# Patient Record
Sex: MALE | Race: WHITE | NOT HISPANIC OR LATINO
[De-identification: names, ages, dates, MRNs, and addresses within clinical notes are randomized per-mention and may not be internally consistent; named-entity substitution may affect disease eponyms.]

---

## 2017-01-12 ENCOUNTER — APPOINTMENT (OUTPATIENT)
Dept: FAMILY MEDICINE | Facility: CLINIC | Age: 57
End: 2017-01-12

## 2017-01-12 ENCOUNTER — NON-APPOINTMENT (OUTPATIENT)
Age: 57
End: 2017-01-12

## 2017-01-12 VITALS
HEIGHT: 72 IN | WEIGHT: 228.13 LBS | BODY MASS INDEX: 30.9 KG/M2 | DIASTOLIC BLOOD PRESSURE: 62 MMHG | SYSTOLIC BLOOD PRESSURE: 120 MMHG

## 2017-01-12 DIAGNOSIS — Z13.0 ENCOUNTER FOR SCREENING FOR OTHER SUSPECTED ENDOCRINE DISORDER: ICD-10-CM

## 2017-01-12 DIAGNOSIS — Z13.228 ENCOUNTER FOR SCREENING FOR OTHER SUSPECTED ENDOCRINE DISORDER: ICD-10-CM

## 2017-01-12 DIAGNOSIS — Z12.5 ENCOUNTER FOR SCREENING FOR MALIGNANT NEOPLASM OF PROSTATE: ICD-10-CM

## 2017-01-12 DIAGNOSIS — Z13.29 ENCOUNTER FOR SCREENING FOR OTHER SUSPECTED ENDOCRINE DISORDER: ICD-10-CM

## 2017-01-12 DIAGNOSIS — Z13.21 ENCOUNTER FOR SCREENING FOR NUTRITIONAL DISORDER: ICD-10-CM

## 2017-01-17 LAB
25(OH)D3 SERPL-MCNC: 29.1 NG/ML
ALBUMIN SERPL ELPH-MCNC: 4.2 G/DL
ALP BLD-CCNC: 42 U/L
ALT SERPL-CCNC: 29 U/L
ANION GAP SERPL CALC-SCNC: 20 MMOL/L
AST SERPL-CCNC: 34 U/L
BASOPHILS # BLD AUTO: 0.04 K/UL
BASOPHILS NFR BLD AUTO: 0.5 %
BILIRUB SERPL-MCNC: 0.3 MG/DL
BUN SERPL-MCNC: 26 MG/DL
CALCIUM SERPL-MCNC: 9.7 MG/DL
CHLORIDE SERPL-SCNC: 97 MMOL/L
CHOLEST SERPL-MCNC: 200 MG/DL
CHOLEST/HDLC SERPL: 4.3 RATIO
CO2 SERPL-SCNC: 24 MMOL/L
CREAT SERPL-MCNC: 1.19 MG/DL
EOSINOPHIL # BLD AUTO: 0.38 K/UL
EOSINOPHIL NFR BLD AUTO: 4.8 %
GLUCOSE SERPL-MCNC: 92 MG/DL
HCT VFR BLD CALC: 46 %
HDLC SERPL-MCNC: 47 MG/DL
HGB BLD-MCNC: 14.9 G/DL
IMM GRANULOCYTES NFR BLD AUTO: 0.3 %
LDLC SERPL CALC-MCNC: 113 MG/DL
LYMPHOCYTES # BLD AUTO: 2.66 K/UL
LYMPHOCYTES NFR BLD AUTO: 33.7 %
MAN DIFF?: NORMAL
MCHC RBC-ENTMCNC: 28.9 PG
MCHC RBC-ENTMCNC: 32.4 GM/DL
MCV RBC AUTO: 89.1 FL
MONOCYTES # BLD AUTO: 0.68 K/UL
MONOCYTES NFR BLD AUTO: 8.6 %
NEUTROPHILS # BLD AUTO: 4.11 K/UL
NEUTROPHILS NFR BLD AUTO: 52.1 %
PLATELET # BLD AUTO: 331 K/UL
POTASSIUM SERPL-SCNC: 4 MMOL/L
PROT SERPL-MCNC: 7.4 G/DL
PSA SERPL-MCNC: 1.3 NG/ML
RBC # BLD: 5.16 M/UL
RBC # FLD: 13 %
SODIUM SERPL-SCNC: 141 MMOL/L
T4 SERPL-MCNC: 7.5 UG/DL
TRIGL SERPL-MCNC: 202 MG/DL
TSH SERPL-ACNC: 1.93 UIU/ML
URATE SERPL-MCNC: 4.8 MG/DL
WBC # FLD AUTO: 7.89 K/UL

## 2017-03-31 ENCOUNTER — RX RENEWAL (OUTPATIENT)
Age: 57
End: 2017-03-31

## 2017-04-03 ENCOUNTER — RX RENEWAL (OUTPATIENT)
Age: 57
End: 2017-04-03

## 2017-04-11 ENCOUNTER — RX RENEWAL (OUTPATIENT)
Age: 57
End: 2017-04-11

## 2017-10-03 ENCOUNTER — RX RENEWAL (OUTPATIENT)
Age: 57
End: 2017-10-03

## 2018-01-05 ENCOUNTER — RX RENEWAL (OUTPATIENT)
Age: 58
End: 2018-01-05

## 2018-01-24 ENCOUNTER — APPOINTMENT (OUTPATIENT)
Dept: FAMILY MEDICINE | Facility: CLINIC | Age: 58
End: 2018-01-24
Payer: COMMERCIAL

## 2018-01-24 ENCOUNTER — NON-APPOINTMENT (OUTPATIENT)
Age: 58
End: 2018-01-24

## 2018-01-24 VITALS
DIASTOLIC BLOOD PRESSURE: 70 MMHG | BODY MASS INDEX: 30.76 KG/M2 | HEART RATE: 74 BPM | OXYGEN SATURATION: 98 % | WEIGHT: 227.13 LBS | SYSTOLIC BLOOD PRESSURE: 122 MMHG | HEIGHT: 72 IN

## 2018-01-24 DIAGNOSIS — Z13.220 ENCOUNTER FOR SCREENING FOR LIPOID DISORDERS: ICD-10-CM

## 2018-01-24 PROCEDURE — 99396 PREV VISIT EST AGE 40-64: CPT | Mod: 25

## 2018-01-24 PROCEDURE — 36415 COLL VENOUS BLD VENIPUNCTURE: CPT

## 2018-01-24 PROCEDURE — 93000 ELECTROCARDIOGRAM COMPLETE: CPT

## 2018-01-25 LAB
ALBUMIN SERPL ELPH-MCNC: 4 G/DL
ALP BLD-CCNC: 40 U/L
ALT SERPL-CCNC: 26 U/L
ANION GAP SERPL CALC-SCNC: 15 MMOL/L
AST SERPL-CCNC: 32 U/L
BASOPHILS # BLD AUTO: 0.03 K/UL
BASOPHILS NFR BLD AUTO: 0.4 %
BILIRUB SERPL-MCNC: 0.3 MG/DL
BUN SERPL-MCNC: 25 MG/DL
CALCIUM SERPL-MCNC: 9.6 MG/DL
CHLORIDE SERPL-SCNC: 101 MMOL/L
CHOLEST SERPL-MCNC: 190 MG/DL
CHOLEST/HDLC SERPL: 4.2 RATIO
CO2 SERPL-SCNC: 24 MMOL/L
CREAT SERPL-MCNC: 1.34 MG/DL
EOSINOPHIL # BLD AUTO: 0.23 K/UL
EOSINOPHIL NFR BLD AUTO: 3.4 %
GLUCOSE SERPL-MCNC: 90 MG/DL
HBA1C MFR BLD HPLC: 5.9 %
HCT VFR BLD CALC: 42.3 %
HDLC SERPL-MCNC: 45 MG/DL
HGB BLD-MCNC: 13.8 G/DL
IMM GRANULOCYTES NFR BLD AUTO: 0 %
LDLC SERPL CALC-MCNC: 98 MG/DL
LYMPHOCYTES # BLD AUTO: 1.73 K/UL
LYMPHOCYTES NFR BLD AUTO: 25.2 %
MAN DIFF?: NORMAL
MCHC RBC-ENTMCNC: 28.8 PG
MCHC RBC-ENTMCNC: 32.6 GM/DL
MCV RBC AUTO: 88.1 FL
MONOCYTES # BLD AUTO: 0.55 K/UL
MONOCYTES NFR BLD AUTO: 8 %
NEUTROPHILS # BLD AUTO: 4.32 K/UL
NEUTROPHILS NFR BLD AUTO: 63 %
PLATELET # BLD AUTO: 296 K/UL
POTASSIUM SERPL-SCNC: 4 MMOL/L
PROT SERPL-MCNC: 6.9 G/DL
PSA SERPL-MCNC: 0.57 NG/ML
RBC # BLD: 4.8 M/UL
RBC # FLD: 13.1 %
SODIUM SERPL-SCNC: 140 MMOL/L
T4 SERPL-MCNC: 7.2 UG/DL
TRIGL SERPL-MCNC: 235 MG/DL
TSH SERPL-ACNC: 1.69 UIU/ML
URATE SERPL-MCNC: 6.1 MG/DL
WBC # FLD AUTO: 6.86 K/UL

## 2018-02-05 ENCOUNTER — APPOINTMENT (OUTPATIENT)
Dept: FAMILY MEDICINE | Facility: CLINIC | Age: 58
End: 2018-02-05
Payer: COMMERCIAL

## 2018-02-05 VITALS
BODY MASS INDEX: 30.75 KG/M2 | TEMPERATURE: 98.6 F | OXYGEN SATURATION: 98 % | WEIGHT: 227 LBS | SYSTOLIC BLOOD PRESSURE: 140 MMHG | DIASTOLIC BLOOD PRESSURE: 72 MMHG | RESPIRATION RATE: 16 BRPM | HEIGHT: 72 IN | HEART RATE: 88 BPM

## 2018-02-05 DIAGNOSIS — Z87.09 PERSONAL HISTORY OF OTHER DISEASES OF THE RESPIRATORY SYSTEM: ICD-10-CM

## 2018-02-05 PROCEDURE — 99214 OFFICE O/P EST MOD 30 MIN: CPT

## 2018-02-28 ENCOUNTER — RX RENEWAL (OUTPATIENT)
Age: 58
End: 2018-02-28

## 2018-07-10 ENCOUNTER — RX RENEWAL (OUTPATIENT)
Age: 58
End: 2018-07-10

## 2018-10-10 ENCOUNTER — RX RENEWAL (OUTPATIENT)
Age: 58
End: 2018-10-10

## 2019-01-02 ENCOUNTER — RX RENEWAL (OUTPATIENT)
Age: 59
End: 2019-01-02

## 2019-03-12 ENCOUNTER — APPOINTMENT (OUTPATIENT)
Dept: FAMILY MEDICINE | Facility: CLINIC | Age: 59
End: 2019-03-12
Payer: COMMERCIAL

## 2019-03-12 ENCOUNTER — LABORATORY RESULT (OUTPATIENT)
Age: 59
End: 2019-03-12

## 2019-03-12 ENCOUNTER — NON-APPOINTMENT (OUTPATIENT)
Age: 59
End: 2019-03-12

## 2019-03-12 VITALS
DIASTOLIC BLOOD PRESSURE: 68 MMHG | OXYGEN SATURATION: 98 % | HEIGHT: 72 IN | RESPIRATION RATE: 16 BRPM | HEART RATE: 63 BPM | WEIGHT: 228 LBS | SYSTOLIC BLOOD PRESSURE: 110 MMHG | BODY MASS INDEX: 30.88 KG/M2

## 2019-03-12 DIAGNOSIS — Z12.5 ENCOUNTER FOR SCREENING FOR MALIGNANT NEOPLASM OF PROSTATE: ICD-10-CM

## 2019-03-12 PROCEDURE — 99396 PREV VISIT EST AGE 40-64: CPT | Mod: 25

## 2019-03-12 PROCEDURE — 93000 ELECTROCARDIOGRAM COMPLETE: CPT

## 2019-03-12 NOTE — PHYSICAL EXAM
[No Acute Distress] : no acute distress [Well Nourished] : well nourished [Well Developed] : well developed [Normal Sclera/Conjunctiva] : normal sclera/conjunctiva [Normal Outer Ear/Nose] : the outer ears and nose were normal in appearance [Supple] : supple [No Lymphadenopathy] : no lymphadenopathy [No Respiratory Distress] : no respiratory distress  [Clear to Auscultation] : lungs were clear to auscultation bilaterally [No Accessory Muscle Use] : no accessory muscle use [Normal Rate] : normal rate  [Regular Rhythm] : with a regular rhythm [Normal S1, S2] : normal S1 and S2 [No Murmur] : no murmur heard [Soft] : abdomen soft [Non Tender] : non-tender [Non-distended] : non-distended [No Masses] : no abdominal mass palpated [No HSM] : no HSM [Normal Bowel Sounds] : normal bowel sounds

## 2019-03-12 NOTE — HISTORY OF PRESENT ILLNESS
[FreeTextEntry1] : annual [de-identified] : annual exam well active regular exercise attempting to eat healthy and exercise denies chest pain palpitations or dyspnea history of bph htn hyperlipids controlled on current medications has not had a colonoscopy again agrees to referral

## 2019-03-12 NOTE — HEALTH RISK ASSESSMENT
[Very Good] : ~his/her~  mood as very good [] : No [Reports changes in hearing] : Reports no changes in hearing [Reports changes in vision] : Reports no changes in vision [Reports changes in dental health] : Reports no changes in dental health [ColonoscopyComments] : referral given

## 2019-03-13 LAB
ALBUMIN SERPL ELPH-MCNC: 4.4 G/DL
ALP BLD-CCNC: 38 U/L
ALT SERPL-CCNC: 26 U/L
ANION GAP SERPL CALC-SCNC: 12 MMOL/L
AST SERPL-CCNC: 31 U/L
BASOPHILS # BLD AUTO: 0.05 K/UL
BASOPHILS NFR BLD AUTO: 0.9 %
BILIRUB SERPL-MCNC: 0.4 MG/DL
BUN SERPL-MCNC: 28 MG/DL
CALCIUM SERPL-MCNC: 9.7 MG/DL
CHLORIDE SERPL-SCNC: 101 MMOL/L
CHOLEST SERPL-MCNC: 176 MG/DL
CHOLEST/HDLC SERPL: 3.8 RATIO
CO2 SERPL-SCNC: 27 MMOL/L
CREAT SERPL-MCNC: 1.23 MG/DL
EOSINOPHIL # BLD AUTO: 0.25 K/UL
EOSINOPHIL NFR BLD AUTO: 4.6 %
GLUCOSE SERPL-MCNC: 88 MG/DL
HBA1C MFR BLD HPLC: 6 %
HCT VFR BLD CALC: 48 %
HDLC SERPL-MCNC: 46 MG/DL
HGB BLD-MCNC: 15.1 G/DL
IMM GRANULOCYTES NFR BLD AUTO: 0.2 %
LDLC SERPL CALC-MCNC: 96 MG/DL
LYMPHOCYTES # BLD AUTO: 1.59 K/UL
LYMPHOCYTES NFR BLD AUTO: 29.3 %
MAN DIFF?: NORMAL
MCHC RBC-ENTMCNC: 27.9 PG
MCHC RBC-ENTMCNC: 31.5 GM/DL
MCV RBC AUTO: 88.6 FL
MONOCYTES # BLD AUTO: 0.79 K/UL
MONOCYTES NFR BLD AUTO: 14.6 %
NEUTROPHILS # BLD AUTO: 2.73 K/UL
NEUTROPHILS NFR BLD AUTO: 50.4 %
PLATELET # BLD AUTO: 286 K/UL
POTASSIUM SERPL-SCNC: 4.4 MMOL/L
PROT SERPL-MCNC: 7 G/DL
PSA SERPL-MCNC: 1.59 NG/ML
RBC # BLD: 5.42 M/UL
RBC # FLD: 13.2 %
SODIUM SERPL-SCNC: 140 MMOL/L
T4 SERPL-MCNC: 7.4 UG/DL
TRIGL SERPL-MCNC: 169 MG/DL
TSH SERPL-ACNC: 1.52 UIU/ML
URATE SERPL-MCNC: 5 MG/DL
WBC # FLD AUTO: 5.42 K/UL

## 2019-03-14 LAB
25(OH)D3 SERPL-MCNC: 42.2 NG/ML
B BURGDOR IGG+IGM SER QL IB: NORMAL

## 2019-06-14 ENCOUNTER — APPOINTMENT (OUTPATIENT)
Dept: FAMILY MEDICINE | Facility: CLINIC | Age: 59
End: 2019-06-14
Payer: COMMERCIAL

## 2019-06-14 VITALS
HEIGHT: 72 IN | HEART RATE: 76 BPM | RESPIRATION RATE: 16 BRPM | BODY MASS INDEX: 30.88 KG/M2 | SYSTOLIC BLOOD PRESSURE: 110 MMHG | DIASTOLIC BLOOD PRESSURE: 60 MMHG | OXYGEN SATURATION: 97 % | WEIGHT: 228 LBS

## 2019-06-14 DIAGNOSIS — H11.31 CONJUNCTIVAL HEMORRHAGE, RIGHT EYE: ICD-10-CM

## 2019-06-14 PROCEDURE — 99213 OFFICE O/P EST LOW 20 MIN: CPT

## 2019-06-14 NOTE — HISTORY OF PRESENT ILLNESS
[FreeTextEntry8] : Pt c/o blood on sclera of R eye that appeared 3 days ago. Pt has HTN, controlled on meds. Pt consistent w/ taking meds. Denies pain in eye, blurry vision, double vision.

## 2019-08-07 ENCOUNTER — APPOINTMENT (OUTPATIENT)
Dept: FAMILY MEDICINE | Facility: CLINIC | Age: 59
End: 2019-08-07
Payer: COMMERCIAL

## 2019-08-07 VITALS
OXYGEN SATURATION: 96 % | BODY MASS INDEX: 30.07 KG/M2 | WEIGHT: 222 LBS | SYSTOLIC BLOOD PRESSURE: 110 MMHG | DIASTOLIC BLOOD PRESSURE: 60 MMHG | HEIGHT: 72 IN | HEART RATE: 67 BPM

## 2019-08-07 DIAGNOSIS — K62.5 HEMORRHAGE OF ANUS AND RECTUM: ICD-10-CM

## 2019-08-07 PROCEDURE — 99214 OFFICE O/P EST MOD 30 MIN: CPT

## 2019-08-07 NOTE — HISTORY OF PRESENT ILLNESS
[FreeTextEntry8] : saw cardiology for dizziness has halter on had blood in stool here for colonoscopy referral which he has not had done and repeat labs well active denies gi symptoms other than blood strains hard stools

## 2019-08-12 LAB
BASOPHILS # BLD AUTO: 0.06 K/UL
BASOPHILS NFR BLD AUTO: 0.7 %
EOSINOPHIL # BLD AUTO: 0.26 K/UL
EOSINOPHIL NFR BLD AUTO: 3.2 %
FERRITIN SERPL-MCNC: 62 NG/ML
HCT VFR BLD CALC: 48.9 %
HGB BLD-MCNC: 15.5 G/DL
IMM GRANULOCYTES NFR BLD AUTO: 0.5 %
IRON SATN MFR SERPL: 17 %
IRON SERPL-MCNC: 66 UG/DL
LYMPHOCYTES # BLD AUTO: 1.96 K/UL
LYMPHOCYTES NFR BLD AUTO: 24 %
MAN DIFF?: NORMAL
MCHC RBC-ENTMCNC: 28.7 PG
MCHC RBC-ENTMCNC: 31.7 GM/DL
MCV RBC AUTO: 90.4 FL
MONOCYTES # BLD AUTO: 0.83 K/UL
MONOCYTES NFR BLD AUTO: 10.2 %
NEUTROPHILS # BLD AUTO: 5.01 K/UL
NEUTROPHILS NFR BLD AUTO: 61.4 %
PLATELET # BLD AUTO: 308 K/UL
RBC # BLD: 5.41 M/UL
RBC # FLD: 13.2 %
TIBC SERPL-MCNC: 380 UG/DL
UIBC SERPL-MCNC: 314 UG/DL
VIT B12 SERPL-MCNC: >2000 PG/ML
WBC # FLD AUTO: 8.16 K/UL

## 2019-09-06 ENCOUNTER — RX RENEWAL (OUTPATIENT)
Age: 59
End: 2019-09-06

## 2019-09-09 ENCOUNTER — RX RENEWAL (OUTPATIENT)
Age: 59
End: 2019-09-09

## 2019-09-10 ENCOUNTER — RX RENEWAL (OUTPATIENT)
Age: 59
End: 2019-09-10

## 2019-12-05 ENCOUNTER — RX RENEWAL (OUTPATIENT)
Age: 59
End: 2019-12-05

## 2019-12-05 RX ORDER — AMOXICILLIN AND CLAVULANATE POTASSIUM 875; 125 MG/1; MG/1
875-125 TABLET, COATED ORAL
Qty: 14 | Refills: 0 | Status: COMPLETED | COMMUNITY
Start: 2018-02-05 | End: 2019-12-05

## 2020-01-03 ENCOUNTER — TRANSCRIPTION ENCOUNTER (OUTPATIENT)
Age: 60
End: 2020-01-03

## 2020-03-10 ENCOUNTER — RX RENEWAL (OUTPATIENT)
Age: 60
End: 2020-03-10

## 2020-03-11 ENCOUNTER — APPOINTMENT (OUTPATIENT)
Dept: FAMILY MEDICINE | Facility: CLINIC | Age: 60
End: 2020-03-11
Payer: COMMERCIAL

## 2020-03-11 ENCOUNTER — RX RENEWAL (OUTPATIENT)
Age: 60
End: 2020-03-11

## 2020-03-11 VITALS
OXYGEN SATURATION: 95 % | RESPIRATION RATE: 16 BRPM | HEART RATE: 60 BPM | WEIGHT: 230 LBS | DIASTOLIC BLOOD PRESSURE: 70 MMHG | SYSTOLIC BLOOD PRESSURE: 110 MMHG | BODY MASS INDEX: 31.15 KG/M2 | HEIGHT: 72 IN

## 2020-03-11 DIAGNOSIS — M72.2 PLANTAR FASCIAL FIBROMATOSIS: ICD-10-CM

## 2020-03-11 PROCEDURE — 99213 OFFICE O/P EST LOW 20 MIN: CPT

## 2020-07-28 ENCOUNTER — RX RENEWAL (OUTPATIENT)
Age: 60
End: 2020-07-28

## 2020-08-24 ENCOUNTER — RX RENEWAL (OUTPATIENT)
Age: 60
End: 2020-08-24

## 2020-09-21 ENCOUNTER — RX RENEWAL (OUTPATIENT)
Age: 60
End: 2020-09-21

## 2020-10-21 ENCOUNTER — RX RENEWAL (OUTPATIENT)
Age: 60
End: 2020-10-21

## 2020-11-19 ENCOUNTER — RX RENEWAL (OUTPATIENT)
Age: 60
End: 2020-11-19

## 2020-12-16 PROBLEM — Z87.09 HISTORY OF ACUTE SINUSITIS: Status: RESOLVED | Noted: 2018-02-05 | Resolved: 2020-12-16

## 2020-12-21 ENCOUNTER — RX RENEWAL (OUTPATIENT)
Age: 60
End: 2020-12-21

## 2021-01-18 ENCOUNTER — RX RENEWAL (OUTPATIENT)
Age: 61
End: 2021-01-18

## 2021-03-01 ENCOUNTER — APPOINTMENT (OUTPATIENT)
Dept: FAMILY MEDICINE | Facility: CLINIC | Age: 61
End: 2021-03-01
Payer: COMMERCIAL

## 2021-03-01 VITALS
WEIGHT: 221 LBS | TEMPERATURE: 97.6 F | HEART RATE: 69 BPM | DIASTOLIC BLOOD PRESSURE: 74 MMHG | OXYGEN SATURATION: 96 % | HEIGHT: 72 IN | SYSTOLIC BLOOD PRESSURE: 116 MMHG | BODY MASS INDEX: 29.93 KG/M2

## 2021-03-01 PROCEDURE — 99213 OFFICE O/P EST LOW 20 MIN: CPT

## 2021-03-01 PROCEDURE — 99072 ADDL SUPL MATRL&STAF TM PHE: CPT

## 2021-03-22 ENCOUNTER — RX RENEWAL (OUTPATIENT)
Age: 61
End: 2021-03-22

## 2021-04-06 ENCOUNTER — APPOINTMENT (OUTPATIENT)
Dept: COLORECTAL SURGERY | Facility: CLINIC | Age: 61
End: 2021-04-06
Payer: COMMERCIAL

## 2021-04-06 VITALS
TEMPERATURE: 97.2 F | HEIGHT: 72 IN | WEIGHT: 210 LBS | DIASTOLIC BLOOD PRESSURE: 65 MMHG | RESPIRATION RATE: 16 BRPM | HEART RATE: 74 BPM | BODY MASS INDEX: 28.44 KG/M2 | SYSTOLIC BLOOD PRESSURE: 101 MMHG

## 2021-04-06 DIAGNOSIS — K92.1 MELENA: ICD-10-CM

## 2021-04-06 DIAGNOSIS — K92.2 GASTROINTESTINAL HEMORRHAGE, UNSPECIFIED: ICD-10-CM

## 2021-04-06 PROCEDURE — 99243 OFF/OP CNSLTJ NEW/EST LOW 30: CPT

## 2021-04-06 PROCEDURE — 99072 ADDL SUPL MATRL&STAF TM PHE: CPT

## 2021-04-06 NOTE — ASSESSMENT
[FreeTextEntry1] : 61-year-old male with gastrointestinal bleeding. Recommend colonoscopy. Risks and benefits of colonoscopy have been discussed which include but not limited to bleeding, perforation, missing a cancer or polyp occurring 5%.\par Recommend high fiber diet, Metamucil daily, sitz baths, stool softeners, pain medications p.r.n.

## 2021-04-06 NOTE — PHYSICAL EXAM
[Abdomen Masses] : No abdominal masses [Abdomen Tenderness] : ~T No ~M abdominal tenderness [Tender] : nontender [JVD] : no jugular venous distention  [Normal Breath Sounds] : Normal breath sounds [Normal Heart Sounds] : normal heart sounds [Normal Rate and Rhythm] : normal rate and rhythm [No Rash or Lesion] : No rash or lesion [Alert] : alert [Oriented to Person] : oriented to person [Oriented to Place] : oriented to place [Oriented to Time] : oriented to time [Calm] : calm [de-identified] : Looks well in no distress, of stated age. [de-identified] : pupils equal reactive to light normocephalic atraumatic. [de-identified] : moves all 4 extremities appropriately with 5 over 5 strength

## 2021-04-06 NOTE — HISTORY OF PRESENT ILLNESS
[FreeTextEntry1] : Consultation from Gene Yao for her gastrointestinal bleeding. 61-year-old male with complaints of GI bleeding for several years symptoms have been worsening bleeding is bright red with bowel movements.patient has never had a colonoscopy

## 2021-04-06 NOTE — REVIEW OF SYSTEMS
[Feeling Poorly] : feeling poorly [As Noted in HPI] : as noted in HPI [Melena] : meldenis [Negative] : Heme/Lymph [FreeTextEntry7] : bleeding

## 2021-04-06 NOTE — CONSULT LETTER
[Dear  ___] : Dear  [unfilled], [Consult Letter:] : I had the pleasure of evaluating your patient, [unfilled]. [Please see my note below.] : Please see my note below. [Consult Closing:] : Thank you very much for allowing me to participate in the care of this patient.  If you have any questions, please do not hesitate to contact me. [Sincerely,] : Sincerely, [FreeTextEntry3] : Tevin Colon M.D. FACS, FASCRS

## 2021-04-19 ENCOUNTER — RX RENEWAL (OUTPATIENT)
Age: 61
End: 2021-04-19

## 2021-05-17 ENCOUNTER — RX RENEWAL (OUTPATIENT)
Age: 61
End: 2021-05-17

## 2021-05-22 ENCOUNTER — APPOINTMENT (OUTPATIENT)
Dept: DISASTER EMERGENCY | Facility: CLINIC | Age: 61
End: 2021-05-22

## 2021-05-22 LAB — SARS-COV-2 N GENE NPH QL NAA+PROBE: NOT DETECTED

## 2021-05-25 ENCOUNTER — APPOINTMENT (OUTPATIENT)
Dept: COLORECTAL SURGERY | Facility: CLINIC | Age: 61
End: 2021-05-25
Payer: COMMERCIAL

## 2021-05-25 DIAGNOSIS — K62.89 OTHER SPECIFIED DISEASES OF ANUS AND RECTUM: ICD-10-CM

## 2021-05-25 PROCEDURE — 45381 COLONOSCOPY SUBMUCOUS NJX: CPT

## 2021-05-25 PROCEDURE — 45380 COLONOSCOPY AND BIOPSY: CPT

## 2021-06-14 ENCOUNTER — RX RENEWAL (OUTPATIENT)
Age: 61
End: 2021-06-14

## 2021-09-13 ENCOUNTER — RX RENEWAL (OUTPATIENT)
Age: 61
End: 2021-09-13

## 2021-10-12 ENCOUNTER — RX RENEWAL (OUTPATIENT)
Age: 61
End: 2021-10-12

## 2021-11-16 ENCOUNTER — RX RENEWAL (OUTPATIENT)
Age: 61
End: 2021-11-16

## 2022-01-27 ENCOUNTER — RX RENEWAL (OUTPATIENT)
Age: 62
End: 2022-01-27

## 2022-01-31 ENCOUNTER — RX RENEWAL (OUTPATIENT)
Age: 62
End: 2022-01-31

## 2022-02-01 ENCOUNTER — RX RENEWAL (OUTPATIENT)
Age: 62
End: 2022-02-01

## 2022-02-08 ENCOUNTER — RX RENEWAL (OUTPATIENT)
Age: 62
End: 2022-02-08

## 2022-04-16 ENCOUNTER — TRANSCRIPTION ENCOUNTER (OUTPATIENT)
Age: 62
End: 2022-04-16

## 2022-04-27 ENCOUNTER — LABORATORY RESULT (OUTPATIENT)
Age: 62
End: 2022-04-27

## 2022-04-27 ENCOUNTER — RX RENEWAL (OUTPATIENT)
Age: 62
End: 2022-04-27

## 2022-04-27 ENCOUNTER — APPOINTMENT (OUTPATIENT)
Dept: FAMILY MEDICINE | Facility: CLINIC | Age: 62
End: 2022-04-27
Payer: COMMERCIAL

## 2022-04-27 VITALS
HEIGHT: 72 IN | TEMPERATURE: 97.2 F | SYSTOLIC BLOOD PRESSURE: 118 MMHG | DIASTOLIC BLOOD PRESSURE: 80 MMHG | HEART RATE: 75 BPM | WEIGHT: 220 LBS | OXYGEN SATURATION: 97 % | BODY MASS INDEX: 29.8 KG/M2

## 2022-04-27 PROCEDURE — 99213 OFFICE O/P EST LOW 20 MIN: CPT

## 2022-04-27 NOTE — HISTORY OF PRESENT ILLNESS
[FreeTextEntry1] : follow up [de-identified] : follow up on htn controlled on current medication denies headache chest pain palpitations or dyspnea had colonoscopy states benign mass has not followe up advised

## 2022-05-02 LAB
25(OH)D3 SERPL-MCNC: 31.9 NG/ML
ALBUMIN SERPL ELPH-MCNC: 4.5 G/DL
ALP BLD-CCNC: 46 U/L
ALT SERPL-CCNC: 25 U/L
ANION GAP SERPL CALC-SCNC: 14 MMOL/L
AST SERPL-CCNC: 27 U/L
BASOPHILS # BLD AUTO: 0.04 K/UL
BASOPHILS NFR BLD AUTO: 0.7 %
BILIRUB SERPL-MCNC: 0.4 MG/DL
BUN SERPL-MCNC: 26 MG/DL
CALCIUM SERPL-MCNC: 10.1 MG/DL
CHLORIDE SERPL-SCNC: 98 MMOL/L
CHOLEST SERPL-MCNC: 195 MG/DL
CO2 SERPL-SCNC: 25 MMOL/L
CREAT SERPL-MCNC: 1.15 MG/DL
EGFR: 71 ML/MIN/1.73M2
EOSINOPHIL # BLD AUTO: 0.29 K/UL
EOSINOPHIL NFR BLD AUTO: 4.8 %
ESTIMATED AVERAGE GLUCOSE: 126 MG/DL
GLUCOSE SERPL-MCNC: 108 MG/DL
HBA1C MFR BLD HPLC: 6 %
HCT VFR BLD CALC: 47.3 %
HDLC SERPL-MCNC: 46 MG/DL
HGB BLD-MCNC: 15.4 G/DL
IMM GRANULOCYTES NFR BLD AUTO: 0.3 %
LDLC SERPL CALC-MCNC: 116 MG/DL
LYMPHOCYTES # BLD AUTO: 1.6 K/UL
LYMPHOCYTES NFR BLD AUTO: 26.3 %
MAN DIFF?: NORMAL
MCHC RBC-ENTMCNC: 28.8 PG
MCHC RBC-ENTMCNC: 32.6 GM/DL
MCV RBC AUTO: 88.4 FL
MONOCYTES # BLD AUTO: 0.73 K/UL
MONOCYTES NFR BLD AUTO: 12 %
NEUTROPHILS # BLD AUTO: 3.41 K/UL
NEUTROPHILS NFR BLD AUTO: 55.9 %
NONHDLC SERPL-MCNC: 148 MG/DL
PLATELET # BLD AUTO: 327 K/UL
POTASSIUM SERPL-SCNC: 4.4 MMOL/L
PROT SERPL-MCNC: 7.1 G/DL
PSA SERPL-MCNC: 1.92 NG/ML
RBC # BLD: 5.35 M/UL
RBC # FLD: 13 %
SODIUM SERPL-SCNC: 137 MMOL/L
T4 SERPL-MCNC: 7.2 UG/DL
TRIGL SERPL-MCNC: 161 MG/DL
TSH SERPL-ACNC: 1.56 UIU/ML
URATE SERPL-MCNC: 4.8 MG/DL
WBC # FLD AUTO: 6.09 K/UL

## 2022-07-11 NOTE — REVIEW OF SYSTEMS
"Jd"Salma Dill returns to clinic today for continued evaluation of allergic rhinitis, cough, and LPR. He was last seen April 11, 2022.     After our discussion last visit, he did purchase a bed he is able to elevate. Since sleeping more upright, his symptoms have improved. He has not had any cough or throat clearing.    He feels that elevating the head of his bed works better than omeprazole twice a day.    He does have some mild nasal congestion that is predominantly on the right. This is improved with using azelastine.    He is not having any heartburn.    He did see Dr. Gualberto Meza in ENT. He did see a nasal septal deviation and inferior nasal turbinate hypertrophy. He suggested a GI evaluation.    [facility] PEQ ALLERGY QUESTIONNAIRE SHORT 7/11/2022   Head or facial pain: -   Facial swelling? No   Sinus pain? No   Sinus pressure? Yes   Ears: No symptoms   Ear discharge? -   Ear pain? -   Hearing loss? -   Nose: -   Nosebleeds? No   Postnasal drip? No   Sneezing? Yes   Runny nose? Yes   Congestion? Yes   Throat: No symptoms   Sore throat? -   Trouble swallowing? -   Voice change? -   Eyes: -   Eye itching? Yes   Eye redness? No   Eye discharge? No   Eye pain?  No   Light sensitivity / light hurts the eyes? No   Lungs: No symptoms   Cough? -   Wheezing? -   Shortness of breath? -   Apnea? -   Choking? -   Chest tightness? -   Skin: No symptoms   Rash? -   Color change of skin? -     Physical Examination:  General: Well-developed, well-nourished, no acute distress. No throat clearing.  Head: No sinus tenderness.  Eyes: Conjunctivae:  No bulbar or palpebral conjunctival injection.  Ears: EAC's clear.  TM's clear.  No pre-auricular nodes.  Nose: Nasal Mucosa:  Pink.  Septum: No apparent deviation.  Turbinates:  No significant edema.  Polyps/Mass:  None visible.  Teeth/Gums:  No bleeding noted.  Oropharynx: No exudates.  Neck: Supple without thyromegaly. No cervical lymphadenopathy.    Respiratory/Chest: Effort: Good.  " Auscultation:  Clear bilaterally.  Skin: Good turgor.  No urticaria or angioedema.  Neuro/Psych: Oriented x 3.    Laboratory 08/28/2018:  IgE level:  167.  ImmunoCAP:  Class II:  DM f.  Class I:  DM pt.      Assessment:  1.  Allergic rhinitis, controlled.  2.  Mild GERD, controlled.  3.  LPR, improved.  4. Nasal septal deviation.   5.  Hypertension on amlodipine, losartan, and metoprolol.  6.  History of nephrolithiasis.  7.  BPH.    Recommendations:  1.  Continue house dust mite avoidance procedures.  2.  Continue azelastine 1 to 2 sprays each nostril b.i.d. p.r.n. rhinitis.  3.  Continue omeprazole 40 milligrams once a day.  4.  GI evaluation.  5.  House dust mite information was given. He thinks he has this at home.   6.  Return to clinic in six months or sooner if needed.    Patient education January 13, 2021:  Discussed long-term safety profile of PPIs.    Patient education September 4, 2018:  Allergic mechanisms and treatment options were reviewed in detail.  House dust mite avoidance was reviewed.   LPR and web site were reviewed.           [Negative] : Respiratory

## 2022-07-26 ENCOUNTER — RX RENEWAL (OUTPATIENT)
Age: 62
End: 2022-07-26

## 2022-09-13 ENCOUNTER — NON-APPOINTMENT (OUTPATIENT)
Age: 62
End: 2022-09-13

## 2022-09-13 ENCOUNTER — APPOINTMENT (OUTPATIENT)
Dept: COLORECTAL SURGERY | Facility: CLINIC | Age: 62
End: 2022-09-13

## 2022-09-13 VITALS
RESPIRATION RATE: 14 BRPM | BODY MASS INDEX: 28.71 KG/M2 | HEART RATE: 80 BPM | HEIGHT: 72 IN | SYSTOLIC BLOOD PRESSURE: 107 MMHG | WEIGHT: 212 LBS | DIASTOLIC BLOOD PRESSURE: 69 MMHG | OXYGEN SATURATION: 97 % | TEMPERATURE: 98.1 F

## 2022-09-13 DIAGNOSIS — R19.4 CHANGE IN BOWEL HABIT: ICD-10-CM

## 2022-09-13 PROCEDURE — 99214 OFFICE O/P EST MOD 30 MIN: CPT

## 2022-09-13 NOTE — HISTORY OF PRESENT ILLNESS
[FreeTextEntry1] : 62-year-old male with previous colon polyps now with changes in bowel habits with increasing loose bowel movements and urgency, symptoms have been worsening over the last 6 months

## 2022-09-13 NOTE — PHYSICAL EXAM
[Abdomen Masses] : No abdominal masses [Abdomen Tenderness] : ~T No ~M abdominal tenderness [Tender] : nontender [No Rash or Lesion] : No rash or lesion [Alert] : alert [Oriented to Person] : oriented to person [Oriented to Place] : oriented to place [Oriented to Time] : oriented to time [Calm] : calm [de-identified] : Looks well in no distress, of stated age.

## 2022-09-13 NOTE — ASSESSMENT
[FreeTextEntry1] :  62-year-old male with changes in bowel habits with increasing looseness and urgency, history of colon polyps.  Recommend CAT scan of abdomen pelvis p.o. and IV contrast CBC BMP liver function test CEA recommend colonoscopy. Risks and benefits of colonoscopy have been discussed which include but not limited to bleeding, perforation, missing a cancer or polyp occurring 5%.

## 2022-09-23 ENCOUNTER — RESULT REVIEW (OUTPATIENT)
Age: 62
End: 2022-09-23

## 2022-09-28 ENCOUNTER — APPOINTMENT (OUTPATIENT)
Dept: CT IMAGING | Facility: CLINIC | Age: 62
End: 2022-09-28

## 2022-09-28 ENCOUNTER — OUTPATIENT (OUTPATIENT)
Dept: OUTPATIENT SERVICES | Facility: HOSPITAL | Age: 62
LOS: 1 days | End: 2022-09-28
Payer: COMMERCIAL

## 2022-09-28 DIAGNOSIS — Z86.010 PERSONAL HISTORY OF COLONIC POLYPS: ICD-10-CM

## 2022-09-28 DIAGNOSIS — R19.4 CHANGE IN BOWEL HABIT: ICD-10-CM

## 2022-09-28 PROCEDURE — 74177 CT ABD & PELVIS W/CONTRAST: CPT

## 2022-09-28 PROCEDURE — 74177 CT ABD & PELVIS W/CONTRAST: CPT | Mod: 26

## 2022-11-28 DIAGNOSIS — Z00.00 ENCOUNTER FOR GENERAL ADULT MEDICAL EXAMINATION W/OUT ABNORMAL FINDINGS: ICD-10-CM

## 2022-12-12 ENCOUNTER — NON-APPOINTMENT (OUTPATIENT)
Age: 62
End: 2022-12-12

## 2022-12-12 LAB
ALBUMIN SERPL ELPH-MCNC: 4.3 G/DL
ALP BLD-CCNC: 52 U/L
ALT SERPL-CCNC: 17 U/L
ANION GAP SERPL CALC-SCNC: 11 MMOL/L
AST SERPL-CCNC: 22 U/L
BASOPHILS # BLD AUTO: 0.05 K/UL
BASOPHILS NFR BLD AUTO: 0.5 %
BILIRUB SERPL-MCNC: 0.4 MG/DL
BUN SERPL-MCNC: 27 MG/DL
CALCIUM SERPL-MCNC: 9.7 MG/DL
CEA SERPL-MCNC: 3 NG/ML
CHLORIDE SERPL-SCNC: 105 MMOL/L
CHOLEST SERPL-MCNC: 192 MG/DL
CO2 SERPL-SCNC: 26 MMOL/L
CREAT SERPL-MCNC: 1.13 MG/DL
EGFR: 73 ML/MIN/1.73M2
EOSINOPHIL # BLD AUTO: 0.25 K/UL
EOSINOPHIL NFR BLD AUTO: 2.4 %
GLUCOSE SERPL-MCNC: 77 MG/DL
HCT VFR BLD CALC: 47.9 %
HDLC SERPL-MCNC: 48 MG/DL
HGB BLD-MCNC: 15.5 G/DL
IMM GRANULOCYTES NFR BLD AUTO: 0.3 %
LDLC SERPL CALC-MCNC: 120 MG/DL
LYMPHOCYTES # BLD AUTO: 0.87 K/UL
LYMPHOCYTES NFR BLD AUTO: 8.5 %
MAN DIFF?: NORMAL
MCHC RBC-ENTMCNC: 28.3 PG
MCHC RBC-ENTMCNC: 32.4 GM/DL
MCV RBC AUTO: 87.6 FL
MONOCYTES # BLD AUTO: 0.85 K/UL
MONOCYTES NFR BLD AUTO: 8.3 %
NEUTROPHILS # BLD AUTO: 8.2 K/UL
NEUTROPHILS NFR BLD AUTO: 80 %
NONHDLC SERPL-MCNC: 144 MG/DL
PLATELET # BLD AUTO: 305 K/UL
POTASSIUM SERPL-SCNC: 4.6 MMOL/L
PROT SERPL-MCNC: 6.4 G/DL
RBC # BLD: 5.47 M/UL
RBC # FLD: 12.9 %
SODIUM SERPL-SCNC: 141 MMOL/L
TRIGL SERPL-MCNC: 119 MG/DL
WBC # FLD AUTO: 10.25 K/UL

## 2022-12-15 ENCOUNTER — RESULT REVIEW (OUTPATIENT)
Age: 62
End: 2022-12-15

## 2022-12-15 ENCOUNTER — APPOINTMENT (OUTPATIENT)
Dept: COLORECTAL SURGERY | Facility: AMBULATORY MEDICAL SERVICES | Age: 62
End: 2022-12-15

## 2022-12-15 PROCEDURE — 45380 COLONOSCOPY AND BIOPSY: CPT

## 2023-01-04 ENCOUNTER — APPOINTMENT (OUTPATIENT)
Dept: GASTROENTEROLOGY | Facility: CLINIC | Age: 63
End: 2023-01-04
Payer: MEDICARE

## 2023-01-04 VITALS
DIASTOLIC BLOOD PRESSURE: 71 MMHG | HEART RATE: 74 BPM | WEIGHT: 206 LBS | HEIGHT: 72 IN | SYSTOLIC BLOOD PRESSURE: 109 MMHG | BODY MASS INDEX: 27.9 KG/M2

## 2023-01-04 DIAGNOSIS — Z86.010 PERSONAL HISTORY OF COLONIC POLYPS: ICD-10-CM

## 2023-01-04 PROCEDURE — 99244 OFF/OP CNSLTJ NEW/EST MOD 40: CPT

## 2023-01-04 PROCEDURE — 99204 OFFICE O/P NEW MOD 45 MIN: CPT

## 2023-01-12 NOTE — ASSESSMENT
[FreeTextEntry1] : Plan:\par Discussed colonoscopy with EMR at length including risk of bleeding or perforation. Discussed alternative to colonoscopy being surgical hemicolectomy with Dr. Colon, discussed risks of that as well. Educated pt at length that if EMR is not deemed safe, will abort procedure. Pt and partner at bedside expressed understanding, as well as pt's daughter via telephone. Instructions reviewed, pt agrees to planned procedure, All questions answered. seen and discussed with Dr. Ovalles. \par \par Cc: Dr. Colon

## 2023-01-12 NOTE — ADDENDUM
[FreeTextEntry1] : I was present with the NP during the history and exam. I discussed the case and examined the patient with the NP and agree with the findings and plan as documented in the NP's note. Briefly, 63 year old man for EMR or large colon polyp. Risks of bleeding and perforation both immediate and delayed, discussed at length. \par \par Cc: Dr. Colon

## 2023-01-12 NOTE — REVIEW OF SYSTEMS
[Diarrhea] : diarrhea [Bleeding] : bleeding [Negative] : Heme/Lymph [Abdominal Pain] : no abdominal pain [Vomiting] : no vomiting [Constipation] : no constipation [Heartburn] : no heartburn [Melena (black stool)] : no melena [Fecal Incontinence (soiling)] : no fecal incontinence [Bloating (gassiness)] : no bloating

## 2023-01-12 NOTE — HISTORY OF PRESENT ILLNESS
[FreeTextEntry1] : Leo Cardenas is a 62 year old male PMH HTN, HLD, BPH presents today as referral from Dr. Colon for large colon polyp.\par \par  Pt had colonoscopy with Dr. Colon revealing a 30mm-40mm polyp, pathology indicated high grade dysplasia. Pt does admit to some irregular bowel habits likely associated, sometimes has diarrhea, also admits to infrequent rectal bleeding with bowel movements. has not happened recently. No melena or cindy hematochezia without bowel movements. No weight loss. Good appetite. No post prandial symptoms. Denies upper GI symptoms such as nausea, vomiting, dysphagia, or odynophagia.

## 2023-01-27 ENCOUNTER — OUTPATIENT (OUTPATIENT)
Dept: OUTPATIENT SERVICES | Facility: HOSPITAL | Age: 63
LOS: 1 days | End: 2023-01-27
Payer: SELF-PAY

## 2023-01-27 VITALS
HEIGHT: 71 IN | WEIGHT: 210.98 LBS | OXYGEN SATURATION: 98 % | DIASTOLIC BLOOD PRESSURE: 69 MMHG | SYSTOLIC BLOOD PRESSURE: 113 MMHG | HEART RATE: 77 BPM | RESPIRATION RATE: 16 BRPM | TEMPERATURE: 98 F

## 2023-01-27 DIAGNOSIS — K63.5 POLYP OF COLON: ICD-10-CM

## 2023-01-27 DIAGNOSIS — Z98.890 OTHER SPECIFIED POSTPROCEDURAL STATES: Chronic | ICD-10-CM

## 2023-01-27 DIAGNOSIS — Z90.89 ACQUIRED ABSENCE OF OTHER ORGANS: Chronic | ICD-10-CM

## 2023-01-27 LAB
ANION GAP SERPL CALC-SCNC: 4 MMOL/L — LOW (ref 5–17)
APTT BLD: 33.3 SEC — SIGNIFICANT CHANGE UP (ref 27.5–35.5)
BASOPHILS # BLD AUTO: 0.04 K/UL — SIGNIFICANT CHANGE UP (ref 0–0.2)
BASOPHILS NFR BLD AUTO: 0.6 % — SIGNIFICANT CHANGE UP (ref 0–2)
BUN SERPL-MCNC: 29 MG/DL — HIGH (ref 7–23)
CALCIUM SERPL-MCNC: 9.6 MG/DL — SIGNIFICANT CHANGE UP (ref 8.5–10.1)
CHLORIDE SERPL-SCNC: 104 MMOL/L — SIGNIFICANT CHANGE UP (ref 96–108)
CO2 SERPL-SCNC: 29 MMOL/L — SIGNIFICANT CHANGE UP (ref 22–31)
CREAT SERPL-MCNC: 1.13 MG/DL — SIGNIFICANT CHANGE UP (ref 0.5–1.3)
EGFR: 72 ML/MIN/1.73M2 — SIGNIFICANT CHANGE UP
EOSINOPHIL # BLD AUTO: 0.2 K/UL — SIGNIFICANT CHANGE UP (ref 0–0.5)
EOSINOPHIL NFR BLD AUTO: 3 % — SIGNIFICANT CHANGE UP (ref 0–6)
GLUCOSE SERPL-MCNC: 99 MG/DL — SIGNIFICANT CHANGE UP (ref 70–99)
HCT VFR BLD CALC: 48.4 % — SIGNIFICANT CHANGE UP (ref 39–50)
HGB BLD-MCNC: 15.8 G/DL — SIGNIFICANT CHANGE UP (ref 13–17)
IMM GRANULOCYTES NFR BLD AUTO: 0.1 % — SIGNIFICANT CHANGE UP (ref 0–0.9)
INR BLD: 0.99 RATIO — SIGNIFICANT CHANGE UP (ref 0.88–1.16)
LYMPHOCYTES # BLD AUTO: 1.52 K/UL — SIGNIFICANT CHANGE UP (ref 1–3.3)
LYMPHOCYTES # BLD AUTO: 22.8 % — SIGNIFICANT CHANGE UP (ref 13–44)
MCHC RBC-ENTMCNC: 28.3 PG — SIGNIFICANT CHANGE UP (ref 27–34)
MCHC RBC-ENTMCNC: 32.6 GM/DL — SIGNIFICANT CHANGE UP (ref 32–36)
MCV RBC AUTO: 86.7 FL — SIGNIFICANT CHANGE UP (ref 80–100)
MONOCYTES # BLD AUTO: 0.63 K/UL — SIGNIFICANT CHANGE UP (ref 0–0.9)
MONOCYTES NFR BLD AUTO: 9.4 % — SIGNIFICANT CHANGE UP (ref 2–14)
NEUTROPHILS # BLD AUTO: 4.27 K/UL — SIGNIFICANT CHANGE UP (ref 1.8–7.4)
NEUTROPHILS NFR BLD AUTO: 64.1 % — SIGNIFICANT CHANGE UP (ref 43–77)
PLATELET # BLD AUTO: 318 K/UL — SIGNIFICANT CHANGE UP (ref 150–400)
POTASSIUM SERPL-MCNC: 3.9 MMOL/L — SIGNIFICANT CHANGE UP (ref 3.5–5.3)
POTASSIUM SERPL-SCNC: 3.9 MMOL/L — SIGNIFICANT CHANGE UP (ref 3.5–5.3)
PROTHROM AB SERPL-ACNC: 11.5 SEC — SIGNIFICANT CHANGE UP (ref 10.5–13.4)
RBC # BLD: 5.58 M/UL — SIGNIFICANT CHANGE UP (ref 4.2–5.8)
RBC # FLD: 12.9 % — SIGNIFICANT CHANGE UP (ref 10.3–14.5)
SODIUM SERPL-SCNC: 137 MMOL/L — SIGNIFICANT CHANGE UP (ref 135–145)
WBC # BLD: 6.67 K/UL — SIGNIFICANT CHANGE UP (ref 3.8–10.5)
WBC # FLD AUTO: 6.67 K/UL — SIGNIFICANT CHANGE UP (ref 3.8–10.5)

## 2023-01-27 PROCEDURE — 99214 OFFICE O/P EST MOD 30 MIN: CPT | Mod: 25

## 2023-01-27 PROCEDURE — 36415 COLL VENOUS BLD VENIPUNCTURE: CPT

## 2023-01-27 PROCEDURE — 85025 COMPLETE CBC W/AUTO DIFF WBC: CPT

## 2023-01-27 PROCEDURE — 85610 PROTHROMBIN TIME: CPT

## 2023-01-27 PROCEDURE — 85730 THROMBOPLASTIN TIME PARTIAL: CPT

## 2023-01-27 PROCEDURE — 93010 ELECTROCARDIOGRAM REPORT: CPT

## 2023-01-27 PROCEDURE — 80048 BASIC METABOLIC PNL TOTAL CA: CPT

## 2023-01-27 PROCEDURE — 93005 ELECTROCARDIOGRAM TRACING: CPT

## 2023-01-27 NOTE — H&P PST ADULT - NSICDXPASTSURGICALHX_GEN_ALL_CORE_FT
PAST SURGICAL HISTORY:  H/O colonoscopy     H/O right knee surgery     History of prior ablation treatment     History of tonsillectomy

## 2023-01-27 NOTE — H&P PST ADULT - ASSESSMENT
1.  Dr Ovalles  office  will instruct patient regarding bowel prep and  medication regimen on day of procedure.  2. Endoscopy booking will call patient the day before surgery for arrival instructions   3. NPO post midnight  4. CBC BMP coags  EKG done   5. Patient instructed to have responsible adult accompany/ drive pt home after procedure  6. Covid test to be scheduled by patient and instructed to quarantine after covid test  63 year old male diagnosed with colon polyp; c/o diarrhea and occasional blood in stool s/p colonoscopy which showed a large polyp; she presents to PST for planned colonoscopy EMR       1.  Dr Ovalles  office  will instruct patient regarding bowel prep and  medication regimen on day of procedure.  2. Endoscopy booking will call patient the day before surgery for arrival instructions   3. NPO post midnight  4. CBC BMP coags  EKG done   5. Patient instructed to have responsible adult accompany/ drive pt home after procedure  6. Covid test to be scheduled by patient and instructed to quarantine after covid test

## 2023-01-27 NOTE — H&P PST ADULT - NSICDXPASTMEDICALHX_GEN_ALL_CORE_FT
PAST MEDICAL HISTORY:  Atrial fibrillation     BPH (benign prostatic hyperplasia)     Colon polyp     HLD (hyperlipidemia)     HTN (hypertension)

## 2023-01-27 NOTE — H&P PST ADULT - HISTORY OF PRESENT ILLNESS
63 year old male diagnosed with colon polyp; c/o diarrhea and occasional blood in stool s/p colonoscopy which showed a large polyp; she presents to PST for planned colonoscopy EMR

## 2023-01-27 NOTE — H&P PST ADULT - NSANTHOSAYNRD_GEN_A_CORE
No. TAURUS screening performed.  STOP BANG Legend: 0-2 = LOW Risk; 3-4 = INTERMEDIATE Risk; 5-8 = HIGH Risk

## 2023-01-28 DIAGNOSIS — K63.5 POLYP OF COLON: ICD-10-CM

## 2023-02-15 ENCOUNTER — RX RENEWAL (OUTPATIENT)
Age: 63
End: 2023-02-15

## 2023-02-15 PROBLEM — I10 ESSENTIAL (PRIMARY) HYPERTENSION: Chronic | Status: ACTIVE | Noted: 2023-01-27

## 2023-02-15 PROBLEM — K63.5 POLYP OF COLON: Chronic | Status: ACTIVE | Noted: 2023-01-27

## 2023-02-15 PROBLEM — E78.5 HYPERLIPIDEMIA, UNSPECIFIED: Chronic | Status: ACTIVE | Noted: 2023-01-27

## 2023-02-15 PROBLEM — N40.0 BENIGN PROSTATIC HYPERPLASIA WITHOUT LOWER URINARY TRACT SYMPTOMS: Chronic | Status: ACTIVE | Noted: 2023-01-27

## 2023-02-15 PROBLEM — I48.91 UNSPECIFIED ATRIAL FIBRILLATION: Chronic | Status: ACTIVE | Noted: 2023-01-27

## 2023-02-17 ENCOUNTER — TRANSCRIPTION ENCOUNTER (OUTPATIENT)
Age: 63
End: 2023-02-17

## 2023-02-17 ENCOUNTER — APPOINTMENT (OUTPATIENT)
Dept: GASTROENTEROLOGY | Facility: HOSPITAL | Age: 63
End: 2023-02-17

## 2023-02-17 ENCOUNTER — OUTPATIENT (OUTPATIENT)
Dept: INPATIENT UNIT | Facility: HOSPITAL | Age: 63
LOS: 1 days | Discharge: HOME | End: 2023-02-17
Payer: MEDICARE

## 2023-02-17 VITALS
RESPIRATION RATE: 16 BRPM | HEIGHT: 71 IN | HEART RATE: 72 BPM | DIASTOLIC BLOOD PRESSURE: 82 MMHG | OXYGEN SATURATION: 98 % | WEIGHT: 210.98 LBS | SYSTOLIC BLOOD PRESSURE: 117 MMHG | TEMPERATURE: 98 F

## 2023-02-17 VITALS
OXYGEN SATURATION: 97 % | SYSTOLIC BLOOD PRESSURE: 104 MMHG | RESPIRATION RATE: 16 BRPM | DIASTOLIC BLOOD PRESSURE: 78 MMHG | TEMPERATURE: 98 F | HEART RATE: 88 BPM

## 2023-02-17 DIAGNOSIS — K63.5 POLYP OF COLON: ICD-10-CM

## 2023-02-17 DIAGNOSIS — Z98.890 OTHER SPECIFIED POSTPROCEDURAL STATES: Chronic | ICD-10-CM

## 2023-02-17 DIAGNOSIS — Z90.89 ACQUIRED ABSENCE OF OTHER ORGANS: Chronic | ICD-10-CM

## 2023-02-17 PROCEDURE — 45381 COLONOSCOPY SUBMUCOUS NJX: CPT

## 2023-02-17 NOTE — ASU PATIENT PROFILE, ADULT - FALL HARM RISK - UNIVERSAL INTERVENTIONS
Bed in lowest position, wheels locked, appropriate side rails in place/Call bell, personal items and telephone in reach/Instruct patient to call for assistance before getting out of bed or chair/Non-slip footwear when patient is out of bed/[place] to call system/Physically safe environment - no spills, clutter or unnecessary equipment/Purposeful Proactive Rounding/Room/bathroom lighting operational, light cord in reach

## 2023-02-17 NOTE — ASU PATIENT PROFILE, ADULT - PRO INTERPRETER NEED 2
Diagnosis: multiple myeloma     Agent/Regimen: revlimid  Cycle: refilled for cycle 53, due to start 2022      ECO - Fully active, able to carry on all pre-disease activities without restrictions.    Is this the first follow-up call to monitor the start of oral chemo? No    Patient has viewed all assigned ANDRE videos. Pt declind    Reviewed and verified Advanced Directives: no    Nursing Assessment: A focused nursing assessment addressing the toxicity of oral chemotherapy was performed and the patient reports the following:   Nausea: NO  Vomiting: NO  Fever: NO  Chills: NO  Other signs of infection: NO  Bleeding: NO  Mucositis: NO  Diarrhea: NO  Constipation: NO  Anorexia: NO  Dysuria: NO  Urinary Bleeding: NO  Cough: NO  Shortness of Breath: NO  Fatigue/Weakness: YES- no more than usual per patient  Numbness/Tingling: NO  Other Neuropathies: NO  Edema: NO  Rash: NO  Hand/Foot Syndrome: NO  Anxiety/Depression/Insomnia: NO  Pain: NO      Treatment Plan: - Treatment consent signed  - Patient has valid pre-authorization  - [facility]S forms completed   - Prescription refilled revlimid script forwarded to ODILON Cornejo to sign and send to pharmacy.    Adherence: Discussed oral chemo adherence with patient. Patient taking medication as prescribed.        Next appointment scheduled:   Future Appointments   Date Time Provider Department Center   3/2/2022  1:30 PM Murphy Lopes MD [address]   3/2/2022  2:00 PM [facility]ATMENT [facility]IR [address]   2022  1:00 PM Narendra Calvo MD [address]   2022  2:00 PM Glenn Lara MD [facility]         Patient instructed to call the office with any questions or concerns.       English

## 2023-02-17 NOTE — ASU PREOP CHECKLIST - HAIR REMOVAL
Called [facility] and spoke to Naheed who advised that they don't have Warfarin or Coumadin but they carry [place]. Is this ok to use as alternative? Please advise. I know it does the same as warfarin and coumadin. Just making sure ok.  thanks hair removal not indicated

## 2023-02-17 NOTE — ASU PREOP CHECKLIST - HEIGHT IN FEET
"Requested Prescriptions   Pending Prescriptions Disp Refills     levothyroxine (SYNTHROID/LEVOTHROID) 75 MCG tablet [Pharmacy Med Name: LEVOTHYROXINE 75MCG TABLET] 30 tablet 3     Sig: TAKE 1 TABLET (75 MCG) BY MOUTH DAILY   Last Written Prescription Date:  12/14/18  Last Fill Quantity: 30,  # refills: 3   Last office visit: 4/4/2019 with prescribing provider:  4/4/19   Future Office Visit:   Next 5 appointments (look out 90 days)    Jun 05, 2019  8:30 AM CDT  [facility] D[facility] [address] [facility] [address]  [phone]   Jun 14, 2019 10:40 AM CDT  Office Visit with Beck Martinez MD  [facility] [address] [facility] [address]  [phone]           Thyroid Protocol Failed - 5/21/2019 10:24 AM        Failed - Normal TSH on file in past 12 months     Recent Labs   Lab Test 12/13/18  1337   TSH 5.27*              Passed - Patient is 12 years or older        Passed - Recent (12 mo) or future (30 days) visit within the authorizing provider's specialty     Patient had office visit in the last 12 months or has a visit in the next 30 days with authorizing provider or within the authorizing provider's specialty.  See \"Patient Info\" tab in inbasket, or \"Choose Columns\" in Meds & Orders section of the refill encounter.              Passed - Medication is active on med list        Passed - No active pregnancy on record     If patient is pregnant or has had a positive pregnancy test, please check TSH.          Passed - No positive pregnancy test in past 12 months     If patient is pregnant or has had a positive pregnancy test, please check TSH.          "
Routing refill request to provider for review/approval because:  Labs out of range:  TSH    CINTHIA BeaulieuN, RN  [facility]      
5

## 2023-02-17 NOTE — ASU DISCHARGE PLAN (ADULT/PEDIATRIC) - NURSING INSTRUCTIONS
For any problems or concerns,contact your doctor. [facility] patients should call the [facility]. If you cannot reach the doctor or clinic, call [facility] Emergency Department at [phone] or go to your local Emergency Department.  A responsible adult should be with you for the rest of the day and night for your safety and to help you if you needed. Resume your medications as listed on the attached Medication Record. Begin with liquids and light food ( tea, toast, Jello, soups). Advance to what you normally eat. Liquids should taken in adequate amounts today.     CALL the DOCTOR:    -Fever greater than  101F  - Signs  of infection such as : increase pain,swelling,redness,or a bad  smell coming from the wound.  -Excessive amount of bleeding.  - Any pain that appears to be getting worse.  - Vomiting  -  If you have  not urinated 8 hours after surgery or have any difficulty urinating.     A responsible adult should be with you for the rest of the day and night for your safety and to help you if you needed.    Review attached FACT SHEET if applicable.

## 2023-02-20 ENCOUNTER — RESULT REVIEW (OUTPATIENT)
Age: 63
End: 2023-02-20

## 2023-02-20 ENCOUNTER — RX RENEWAL (OUTPATIENT)
Age: 63
End: 2023-02-20

## 2023-02-21 ENCOUNTER — APPOINTMENT (OUTPATIENT)
Dept: COLORECTAL SURGERY | Facility: CLINIC | Age: 63
End: 2023-02-21
Payer: COMMERCIAL

## 2023-02-21 PROCEDURE — 99443: CPT

## 2023-02-22 ENCOUNTER — APPOINTMENT (OUTPATIENT)
Dept: CT IMAGING | Facility: CLINIC | Age: 63
End: 2023-02-22
Payer: MEDICARE

## 2023-02-22 ENCOUNTER — OUTPATIENT (OUTPATIENT)
Dept: OUTPATIENT SERVICES | Facility: HOSPITAL | Age: 63
LOS: 1 days | End: 2023-02-22
Payer: MEDICARE

## 2023-02-22 DIAGNOSIS — Z98.890 OTHER SPECIFIED POSTPROCEDURAL STATES: Chronic | ICD-10-CM

## 2023-02-22 DIAGNOSIS — Z90.89 ACQUIRED ABSENCE OF OTHER ORGANS: Chronic | ICD-10-CM

## 2023-02-22 DIAGNOSIS — K62.89 OTHER SPECIFIED DISEASES OF ANUS AND RECTUM: ICD-10-CM

## 2023-02-22 PROCEDURE — 71260 CT THORAX DX C+: CPT

## 2023-02-22 PROCEDURE — 71260 CT THORAX DX C+: CPT | Mod: 26,MH

## 2023-02-22 PROCEDURE — 74177 CT ABD & PELVIS W/CONTRAST: CPT | Mod: 26,MH

## 2023-02-22 PROCEDURE — 74177 CT ABD & PELVIS W/CONTRAST: CPT

## 2023-02-24 DIAGNOSIS — I48.91 UNSPECIFIED ATRIAL FIBRILLATION: ICD-10-CM

## 2023-02-24 DIAGNOSIS — I10 ESSENTIAL (PRIMARY) HYPERTENSION: ICD-10-CM

## 2023-02-24 DIAGNOSIS — D12.5 BENIGN NEOPLASM OF SIGMOID COLON: ICD-10-CM

## 2023-02-24 DIAGNOSIS — N13.8 OTHER OBSTRUCTIVE AND REFLUX UROPATHY: ICD-10-CM

## 2023-02-24 DIAGNOSIS — Z79.82 LONG TERM (CURRENT) USE OF ASPIRIN: ICD-10-CM

## 2023-02-24 DIAGNOSIS — Z86.010 PERSONAL HISTORY OF COLONIC POLYPS: ICD-10-CM

## 2023-02-24 DIAGNOSIS — N40.1 BENIGN PROSTATIC HYPERPLASIA WITH LOWER URINARY TRACT SYMPTOMS: ICD-10-CM

## 2023-02-24 DIAGNOSIS — E78.5 HYPERLIPIDEMIA, UNSPECIFIED: ICD-10-CM

## 2023-03-03 ENCOUNTER — OUTPATIENT (OUTPATIENT)
Dept: OUTPATIENT SERVICES | Facility: HOSPITAL | Age: 63
LOS: 1 days | End: 2023-03-03
Payer: MEDICARE

## 2023-03-03 ENCOUNTER — RESULT REVIEW (OUTPATIENT)
Age: 63
End: 2023-03-03

## 2023-03-03 VITALS
HEIGHT: 72 IN | OXYGEN SATURATION: 98 % | SYSTOLIC BLOOD PRESSURE: 114 MMHG | RESPIRATION RATE: 16 BRPM | DIASTOLIC BLOOD PRESSURE: 66 MMHG | HEART RATE: 93 BPM | WEIGHT: 216.05 LBS | TEMPERATURE: 98 F

## 2023-03-03 DIAGNOSIS — Z90.89 ACQUIRED ABSENCE OF OTHER ORGANS: Chronic | ICD-10-CM

## 2023-03-03 DIAGNOSIS — C19 MALIGNANT NEOPLASM OF RECTOSIGMOID JUNCTION: ICD-10-CM

## 2023-03-03 DIAGNOSIS — Z98.890 OTHER SPECIFIED POSTPROCEDURAL STATES: Chronic | ICD-10-CM

## 2023-03-03 DIAGNOSIS — Z01.818 ENCOUNTER FOR OTHER PREPROCEDURAL EXAMINATION: ICD-10-CM

## 2023-03-03 LAB
A1C WITH ESTIMATED AVERAGE GLUCOSE RESULT: 6 % — HIGH (ref 4–5.6)
ABO RH CONFIRMATION: SIGNIFICANT CHANGE UP
ALBUMIN SERPL ELPH-MCNC: 3.9 G/DL — SIGNIFICANT CHANGE UP (ref 3.3–5)
ALP SERPL-CCNC: 43 U/L — SIGNIFICANT CHANGE UP (ref 40–120)
ALT FLD-CCNC: 29 U/L — SIGNIFICANT CHANGE UP (ref 12–78)
ANION GAP SERPL CALC-SCNC: 3 MMOL/L — LOW (ref 5–17)
APTT BLD: 32.6 SEC — SIGNIFICANT CHANGE UP (ref 27.5–35.5)
AST SERPL-CCNC: 28 U/L — SIGNIFICANT CHANGE UP (ref 15–37)
BASOPHILS # BLD AUTO: 0.05 K/UL — SIGNIFICANT CHANGE UP (ref 0–0.2)
BASOPHILS NFR BLD AUTO: 0.7 % — SIGNIFICANT CHANGE UP (ref 0–2)
BILIRUB DIRECT SERPL-MCNC: <0.1 MG/DL — SIGNIFICANT CHANGE UP (ref 0–0.3)
BILIRUB SERPL-MCNC: 0.4 MG/DL — SIGNIFICANT CHANGE UP (ref 0.2–1.2)
BLD GP AB SCN SERPL QL: SIGNIFICANT CHANGE UP
BUN SERPL-MCNC: 28 MG/DL — HIGH (ref 7–23)
CALCIUM SERPL-MCNC: 9.5 MG/DL — SIGNIFICANT CHANGE UP (ref 8.5–10.1)
CEA SERPL-MCNC: 2.2 NG/ML — SIGNIFICANT CHANGE UP (ref 0–3.8)
CHLORIDE SERPL-SCNC: 105 MMOL/L — SIGNIFICANT CHANGE UP (ref 96–108)
CO2 SERPL-SCNC: 29 MMOL/L — SIGNIFICANT CHANGE UP (ref 22–31)
CREAT SERPL-MCNC: 1.42 MG/DL — HIGH (ref 0.5–1.3)
EGFR: 55 ML/MIN/1.73M2 — LOW
EOSINOPHIL # BLD AUTO: 0.3 K/UL — SIGNIFICANT CHANGE UP (ref 0–0.5)
EOSINOPHIL NFR BLD AUTO: 4.3 % — SIGNIFICANT CHANGE UP (ref 0–6)
ESTIMATED AVERAGE GLUCOSE: 126 MG/DL — HIGH (ref 68–114)
GLUCOSE SERPL-MCNC: 110 MG/DL — HIGH (ref 70–99)
HCT VFR BLD CALC: 47.3 % — SIGNIFICANT CHANGE UP (ref 39–50)
HGB BLD-MCNC: 15.4 G/DL — SIGNIFICANT CHANGE UP (ref 13–17)
IMM GRANULOCYTES NFR BLD AUTO: 0.4 % — SIGNIFICANT CHANGE UP (ref 0–0.9)
INR BLD: 1.03 RATIO — SIGNIFICANT CHANGE UP (ref 0.88–1.16)
LYMPHOCYTES # BLD AUTO: 1.84 K/UL — SIGNIFICANT CHANGE UP (ref 1–3.3)
LYMPHOCYTES # BLD AUTO: 26.1 % — SIGNIFICANT CHANGE UP (ref 13–44)
MCHC RBC-ENTMCNC: 28.2 PG — SIGNIFICANT CHANGE UP (ref 27–34)
MCHC RBC-ENTMCNC: 32.6 GM/DL — SIGNIFICANT CHANGE UP (ref 32–36)
MCV RBC AUTO: 86.6 FL — SIGNIFICANT CHANGE UP (ref 80–100)
MONOCYTES # BLD AUTO: 0.6 K/UL — SIGNIFICANT CHANGE UP (ref 0–0.9)
MONOCYTES NFR BLD AUTO: 8.5 % — SIGNIFICANT CHANGE UP (ref 2–14)
NEUTROPHILS # BLD AUTO: 4.22 K/UL — SIGNIFICANT CHANGE UP (ref 1.8–7.4)
NEUTROPHILS NFR BLD AUTO: 60 % — SIGNIFICANT CHANGE UP (ref 43–77)
PLATELET # BLD AUTO: 323 K/UL — SIGNIFICANT CHANGE UP (ref 150–400)
POTASSIUM SERPL-MCNC: 3.5 MMOL/L — SIGNIFICANT CHANGE UP (ref 3.5–5.3)
POTASSIUM SERPL-SCNC: 3.5 MMOL/L — SIGNIFICANT CHANGE UP (ref 3.5–5.3)
PROT SERPL-MCNC: 7.5 GM/DL — SIGNIFICANT CHANGE UP (ref 6–8.3)
PROTHROM AB SERPL-ACNC: 11.9 SEC — SIGNIFICANT CHANGE UP (ref 10.5–13.4)
RBC # BLD: 5.46 M/UL — SIGNIFICANT CHANGE UP (ref 4.2–5.8)
RBC # FLD: 13.2 % — SIGNIFICANT CHANGE UP (ref 10.3–14.5)
SODIUM SERPL-SCNC: 137 MMOL/L — SIGNIFICANT CHANGE UP (ref 135–145)
WBC # BLD: 7.04 K/UL — SIGNIFICANT CHANGE UP (ref 3.8–10.5)
WBC # FLD AUTO: 7.04 K/UL — SIGNIFICANT CHANGE UP (ref 3.8–10.5)

## 2023-03-03 PROCEDURE — 86850 RBC ANTIBODY SCREEN: CPT

## 2023-03-03 PROCEDURE — 93005 ELECTROCARDIOGRAM TRACING: CPT

## 2023-03-03 PROCEDURE — 83036 HEMOGLOBIN GLYCOSYLATED A1C: CPT

## 2023-03-03 PROCEDURE — 80053 COMPREHEN METABOLIC PANEL: CPT

## 2023-03-03 PROCEDURE — 85025 COMPLETE CBC W/AUTO DIFF WBC: CPT

## 2023-03-03 PROCEDURE — 93010 ELECTROCARDIOGRAM REPORT: CPT

## 2023-03-03 PROCEDURE — 82378 CARCINOEMBRYONIC ANTIGEN: CPT

## 2023-03-03 PROCEDURE — 86900 BLOOD TYPING SEROLOGIC ABO: CPT

## 2023-03-03 PROCEDURE — 36415 COLL VENOUS BLD VENIPUNCTURE: CPT

## 2023-03-03 PROCEDURE — 71046 X-RAY EXAM CHEST 2 VIEWS: CPT

## 2023-03-03 PROCEDURE — 82248 BILIRUBIN DIRECT: CPT

## 2023-03-03 PROCEDURE — 85730 THROMBOPLASTIN TIME PARTIAL: CPT

## 2023-03-03 PROCEDURE — 71046 X-RAY EXAM CHEST 2 VIEWS: CPT | Mod: 26

## 2023-03-03 PROCEDURE — 86901 BLOOD TYPING SEROLOGIC RH(D): CPT

## 2023-03-03 PROCEDURE — 99214 OFFICE O/P EST MOD 30 MIN: CPT | Mod: 25

## 2023-03-03 PROCEDURE — 85610 PROTHROMBIN TIME: CPT

## 2023-03-03 RX ORDER — FINASTERIDE 5 MG/1
1 TABLET, FILM COATED ORAL
Qty: 0 | Refills: 0 | DISCHARGE

## 2023-03-03 NOTE — H&P PST ADULT - HISTORY OF PRESENT ILLNESS
63 year old male diagnosed with colon polyp; c/o diarrhea and occasional blood in stool s/p colonoscopy which showed a large polyp; she presents to PST for planned colonoscopy EMR  63 year old male diagnosed with rectosigmoid cancer c/o diarrhea and occasional blood in stool s/p colonoscopy which showed a large polyp; s/p  colonoscopy EMR 1/2023 ; unable to remove polyp; he presents to [facility] for planned laparoscopic possible open lower anterior resection lymph node dissection

## 2023-03-03 NOTE — H&P PST ADULT - NSICDXPROCEDURE_GEN_ALL_CORE_FT
PROCEDURES:  Colonoscopy 27-Jan-2023 10:54:10  Rashmi Carreno   PROCEDURES:  Colon resection 03-Mar-2023 13:48:54  Rashmi Carreno

## 2023-03-03 NOTE — H&P PST ADULT - NSICDXPASTMEDICALHX_GEN_ALL_CORE_FT
PAST MEDICAL HISTORY:  Atrial fibrillation     BPH (benign prostatic hyperplasia)     Colon polyp     HLD (hyperlipidemia)     HTN (hypertension)      PAST MEDICAL HISTORY:  Atrial fibrillation     BPH (benign prostatic hyperplasia)     Colon polyp     HLD (hyperlipidemia)     HTN (hypertension)     Rectosigmoid cancer

## 2023-03-03 NOTE — H&P PST ADULT - ASSESSMENT
63 year old male diagnosed with colon polyp; c/o diarrhea and occasional blood in stool s/p colonoscopy which showed a large polyp; she presents to PST for planned colonoscopy EMR       1.  Dr Ovalles  office  will instruct patient regarding bowel prep and  medication regimen on day of procedure.  2. Endoscopy booking will call patient the day before surgery for arrival instructions   3. NPO post midnight  4. CBC BMP coags  EKG done   5. Patient instructed to have responsible adult accompany/ drive pt home after procedure  6. Covid test to be scheduled by patient and instructed to quarantine after covid test  63 year old male diagnosed with rectosigmoid cancer c/o diarrhea and occasional blood in stool s/p colonoscopy which showed a large polyp; s/p  colonoscopy EMR 2023 ; unable to remove polyp; he presents to PST for planned laparoscopic possible open lower anterior resection lymph node dissection      Plan:  1. PST instructions given ; NPO status/  instructions to be given by ASU   2. Pt instructed to take following meds on day of surgery:   3. Pt instructed to take routine evening medications unless indicated   4. Stop NSAIDS ( Aspirin Alev Motrin Mobic Diclofenac), herbal supplements , MVI , Vitamin fish oil 7 days prior to surgery  unless   directed by surgeon or cardiologist;   5. Medical Optimization  with    6. EZ wash instructions given & mupirocin instructions given  7. Labs EKG CXR as per surgeon request   8. Pt instructed to self quarantine after Covid test   9. Covid Testing scheduled Pt notified and aware  10. Pt denies covid symptoms shortness of breath fever cough     CAPRINI SCORE [CLOT]    AGE RELATED RISK FACTORS                                                       MOBILITY RELATED FACTORS  [ ] Age 41-60 years                                            (1 Point)                  [ ] Bed rest                                                        (1 Point)  [ ] Age: 61-74 years                                           (2 Points)                 [ ] Plaster cast                                                   (2 Points)  [ ] Age= 75 years                                              (3 Points)                 [ ] Bed bound for more than 72 hours                 (2 Points)    DISEASE RELATED RISK FACTORS                                               GENDER SPECIFIC FACTORS  [ ] Edema in the lower extremities                       (1 Point)                  [ ] Pregnancy                                                     (1 Point)  [ ] Varicose veins                                               (1 Point)                  [ ] Post-partum < 6 weeks                                   (1 Point)             [ ] BMI > 25 Kg/m2                                            (1 Point)                  [ ] Hormonal therapy  or oral contraception          (1 Point)                 [ ] Sepsis (in the previous month)                        (1 Point)                  [ ] History of pregnancy complications                 (1 point)  [ ] Pneumonia or serious lung disease                                               [ ] Unexplained or recurrent                     (1 Point)           (in the previous month)                               (1 Point)  [ ] Abnormal pulmonary function test                     (1 Point)                 SURGERY RELATED RISK FACTORS  [ ] Acute myocardial infarction                              (1 Point)                 [ ]  Section                                             (1 Point)  [ ] Congestive heart failure (in the previous month)  (1 Point)               [ ] Minor surgery                                                  (1 Point)   [ ] Inflammatory bowel disease                             (1 Point)                 [ ] Arthroscopic surgery                                        (2 Points)  [ ] Central venous access                                      (2 Points)                [ ] General surgery lasting more than 45 minutes   (2 Points)       [ ] Stroke (in the previous month)                          (5 Points)               [ ] Elective arthroplasty                                         (5 Points)            ( ) presents and past malignancy                           (2 points)                                                                                                         HEMATOLOGY RELATED FACTORS                                                 TRAUMA RELATED RISK FACTORS  [ ] Prior episodes of VTE                                     (3 Points)                 [ ] Fracture of the hip, pelvis, or leg                       (5 Points)  [ ] Positive family history for VTE                         (3 Points)                 [ ] Acute spinal cord injury (in the previous month)  (5 Points)  [ ] Prothrombin 66396 A                                     (3 Points)                 [ ] Paralysis  (less than 1 month)                             (5 Points)  [ ] Factor V Leiden                                             (3 Points)                  [ ] Multiple Trauma within 1 month                        (5 Points)  [ ] Lupus anticoagulants                                     (3 Points)                                                           [ ] Anticardiolipin antibodies                               (3 Points)                                                       [ ] High homocysteine in the blood                      (3 Points)                                             [ ] Other congenital or acquired thrombophilia      (3 Points)                                                [ ] Heparin induced thrombocytopenia                  (3 Points)                                          Total Score [          ]    The Caprini score indicates that this patient is at high risk for a VTE event (score 6 or greater). Surgical patients in this group will benefit from both pharmacologic prophylaxis and intermittent compression devices.  The surgical team will determine the balance between VTE risk and bleeding risk, and other clinical considerations    63 year old male diagnosed with rectosigmoid cancer c/o diarrhea and occasional blood in stool s/p colonoscopy which showed a large polyp; s/p  colonoscopy EMR 2023 ; unable to remove polyp; he presents to PST for planned laparoscopic possible open lower anterior resection lymph node dissection      Plan:  1. PST instructions given ; NPO status/  instructions to be given by ASU   2. Pt instructed to take following meds on day of surgery: tamsulosin   3. Pt instructed to take routine evening medications unless indicated   4. Stop NSAIDS ( Aspirin Alev Motrin Mobic Diclofenac), herbal supplements , MVI , Vitamin fish oil 7 days prior to surgery  unless   directed by surgeon or cardiologist;   5. Medical Optimization  with NP Gene Yao   6. EZ wash instructions given   7. Labs EKG CXR as per surgeon request   8. Pt instructed to self quarantine after Covid test   9. Covid Testing scheduled Pt notified and aware  10. Pt denies covid symptoms shortness of breath fever cough     CAPRINI SCORE [CLOT]    AGE RELATED RISK FACTORS                                                       MOBILITY RELATED FACTORS  [ ] Age 41-60 years                                            (1 Point)                  [ ] Bed rest                                                        (1 Point)  [x ] Age: 61-74 years                                           (2 Points)                 [ ] Plaster cast                                                   (2 Points)  [ ] Age= 75 years                                              (3 Points)                 [ ] Bed bound for more than 72 hours                 (2 Points)    DISEASE RELATED RISK FACTORS                                               GENDER SPECIFIC FACTORS  [ ] Edema in the lower extremities                       (1 Point)                  [ ] Pregnancy                                                     (1 Point)  [ ] Varicose veins                                               (1 Point)                  [ ] Post-partum < 6 weeks                                   (1 Point)             [x ] BMI > 25 Kg/m2                                            (1 Point)                  [ ] Hormonal therapy  or oral contraception          (1 Point)                 [ ] Sepsis (in the previous month)                        (1 Point)                  [ ] History of pregnancy complications                 (1 point)  [ ] Pneumonia or serious lung disease                                               [ ] Unexplained or recurrent                     (1 Point)           (in the previous month)                               (1 Point)  [ ] Abnormal pulmonary function test                     (1 Point)                 SURGERY RELATED RISK FACTORS  [ ] Acute myocardial infarction                              (1 Point)                 [ ]  Section                                             (1 Point)  [ ] Congestive heart failure (in the previous month)  (1 Point)               [ ] Minor surgery                                                  (1 Point)   [ ] Inflammatory bowel disease                             (1 Point)                 [ ] Arthroscopic surgery                                        (2 Points)  [ ] Central venous access                                      (2 Points)                [ x] General surgery lasting more than 45 minutes   (2 Points)       [ ] Stroke (in the previous month)                          (5 Points)               [ ] Elective arthroplasty                                         (5 Points)            (x ) presents and past malignancy                           (2 points)                                                                                                         HEMATOLOGY RELATED FACTORS                                                 TRAUMA RELATED RISK FACTORS  [ ] Prior episodes of VTE                                     (3 Points)                 [ ] Fracture of the hip, pelvis, or leg                       (5 Points)  [ ] Positive family history for VTE                         (3 Points)                 [ ] Acute spinal cord injury (in the previous month)  (5 Points)  [ ] Prothrombin 23892 A                                     (3 Points)                 [ ] Paralysis  (less than 1 month)                             (5 Points)  [ ] Factor V Leiden                                             (3 Points)                  [ ] Multiple Trauma within 1 month                        (5 Points)  [ ] Lupus anticoagulants                                     (3 Points)                                                           [ ] Anticardiolipin antibodies                               (3 Points)                                                       [ ] High homocysteine in the blood                      (3 Points)                                             [ ] Other congenital or acquired thrombophilia      (3 Points)                                                [ ] Heparin induced thrombocytopenia                  (3 Points)                                          Total Score [    7      ]    The Caprini score indicates that this patient is at high risk for a VTE event (score 6 or greater). Surgical patients in this group will benefit from both pharmacologic prophylaxis and intermittent compression devices.  The surgical team will determine the balance between VTE risk and bleeding risk, and other clinical considerations

## 2023-03-04 DIAGNOSIS — C19 MALIGNANT NEOPLASM OF RECTOSIGMOID JUNCTION: ICD-10-CM

## 2023-03-04 DIAGNOSIS — Z01.818 ENCOUNTER FOR OTHER PREPROCEDURAL EXAMINATION: ICD-10-CM

## 2023-03-06 ENCOUNTER — APPOINTMENT (OUTPATIENT)
Dept: FAMILY MEDICINE | Facility: CLINIC | Age: 63
End: 2023-03-06
Payer: MEDICARE

## 2023-03-06 VITALS
TEMPERATURE: 97 F | HEART RATE: 82 BPM | WEIGHT: 215 LBS | HEIGHT: 72 IN | OXYGEN SATURATION: 96 % | RESPIRATION RATE: 16 BRPM | BODY MASS INDEX: 29.12 KG/M2 | DIASTOLIC BLOOD PRESSURE: 72 MMHG | SYSTOLIC BLOOD PRESSURE: 120 MMHG

## 2023-03-06 DIAGNOSIS — N40.1 BENIGN PROSTATIC HYPERPLASIA WITH LOWER URINARY TRACT SYMPMS: ICD-10-CM

## 2023-03-06 DIAGNOSIS — Z01.818 ENCOUNTER FOR OTHER PREPROCEDURAL EXAMINATION: ICD-10-CM

## 2023-03-06 DIAGNOSIS — K63.5 POLYP OF COLON: ICD-10-CM

## 2023-03-06 DIAGNOSIS — C19 MALIGNANT NEOPLASM OF RECTOSIGMOID JUNCTION: ICD-10-CM

## 2023-03-06 DIAGNOSIS — N13.8 BENIGN PROSTATIC HYPERPLASIA WITH LOWER URINARY TRACT SYMPMS: ICD-10-CM

## 2023-03-06 PROCEDURE — 99214 OFFICE O/P EST MOD 30 MIN: CPT

## 2023-03-06 RX ORDER — FLUTICASONE PROPIONATE 50 UG/1
50 SPRAY, METERED NASAL DAILY
Qty: 16 | Refills: 0 | Status: DISCONTINUED | COMMUNITY
Start: 2017-03-31 | End: 2023-03-06

## 2023-03-06 NOTE — HISTORY OF PRESENT ILLNESS
[No Pertinent Cardiac History] : no history of aortic stenosis, atrial fibrillation, coronary artery disease, recent myocardial infarction, or implantable device/pacemaker [No Pertinent Pulmonary History] : no history of asthma, COPD, sleep apnea, or smoking [No Adverse Anesthesia Reaction] : no adverse anesthesia reaction in self or family member [(Patient denies any chest pain, claudication, dyspnea on exertion, orthopnea, palpitations or syncope)] : Patient denies any chest pain, claudication, dyspnea on exertion, orthopnea, palpitations or syncope [Chronic Anticoagulation] : no chronic anticoagulation [Chronic Kidney Disease] : no chronic kidney disease [Diabetes] : no diabetes [FreeTextEntry1] : Laparoscopic possible open lower anterior resection [FreeTextEntry2] : 3/13/2023 [FreeTextEntry3] : Dr. Tevin Colon  [FreeTextEntry4] : \par 63 year old male presents for a medical clearance. He has a large polyp of his rectosigmoid colon that is concerning for possible cancer and was not able to be removed via a colonoscopy. He has been scheduled for a laparoscopic possible open anterior lower resection of his colon.\par \par He has HTN- on Olmesartan- HCTZ\par Has Hyperlipidemia- on Fenofibrate\par H/o PVC ablation in 2020 \par He follows with cardiology- seen in Jan 2023 for clearance prior to previous colonoscopy \par \par Has BPH- on Tamsulosin\par  [FreeTextEntry7] : 12/2020- stress echo- negative for ischemia

## 2023-03-06 NOTE — PHYSICAL EXAM
[No Acute Distress] : no acute distress [Well Nourished] : well nourished [Well Developed] : well developed [Well-Appearing] : well-appearing [Normal Sclera/Conjunctiva] : normal sclera/conjunctiva [PERRL] : pupils equal round and reactive to light [Normal Outer Ear/Nose] : the outer ears and nose were normal in appearance [Normal Oropharynx] : the oropharynx was normal [Normal Appearance] : was normal in appearance [Neck Supple] : was supple [No Respiratory Distress] : no respiratory distress  [Clear to Auscultation] : lungs were clear to auscultation bilaterally [Normal Rate] : normal rate  [Regular Rhythm] : with a regular rhythm [Normal S1, S2] : normal S1 and S2 [No Murmur] : no murmur heard [No Edema] : there was no peripheral edema [Soft] : abdomen soft [Non Tender] : non-tender [Normal Posterior Cervical Nodes] : no posterior cervical lymphadenopathy [Normal Anterior Cervical Nodes] : no anterior cervical lymphadenopathy [No Rash] : no rash [Alert and Oriented x3] : oriented to person, place, and time [de-identified] : B

## 2023-03-06 NOTE — ASSESSMENT
[High Risk Surgery - Intraperitoneal, Intrathoracic or Supringuinal Vascular Procedures] : High Risk Surgery - Intraperitoneal, Intrathoracic or Supringuinal Vascular Procedures - No (0) [Ischemic Heart Disease] : Ischemic Heart Disease - No (0) [Congestive Heart Failure] : Congestive Heart Failure - No (0) [Creatinine >= 2mg/dL (1 Point)] : Creatinine >= 2mg/dL - No (0) [Prior Cerebrovascular Accident or TIA] : Prior Cerebrovascular Accident or TIA - No (0) [Insulin-dependent Diabetic (1 Point)] : Insulin-dependent Diabetic - No (0) [0] : 0 , RCRI Class: I, Risk of Post-Op Cardiac Complications: 3.9%, 95% CI for Risk Estimate: 2.8% - 5.4% [Patient Optimized for Surgery] : Patient optimized for surgery [Continue medications as is] : Continue current medications [As per surgery] : as per surgery [FreeTextEntry4] : \par At this time, there are no medical contraindications for the planned surgery and anesthesia.\par \par Renal function noted to be mildly decreased- creatinine elevated at 1.42\par Recommend patient maintain water hydration and be monitored, would recommend repeat blood work after surgery to reassess renal function \par No contraindication for the planned surgery\par \par HTN- stable, c/w Olmesartan- HCTZ\par \par Hyperlipidemia- c/w Fenofibrate\par \par BPH- c/w Tamsulosin\par \par Please call with any questions.\par  [FreeTextEntry6] : Hold ASA 5-7 days prior to surgery

## 2023-03-07 PROBLEM — C19 MALIGNANT NEOPLASM OF RECTOSIGMOID JUNCTION: Chronic | Status: ACTIVE | Noted: 2023-03-03

## 2023-03-13 ENCOUNTER — TRANSCRIPTION ENCOUNTER (OUTPATIENT)
Age: 63
End: 2023-03-13

## 2023-03-13 ENCOUNTER — APPOINTMENT (OUTPATIENT)
Dept: COLORECTAL SURGERY | Facility: HOSPITAL | Age: 63
End: 2023-03-13

## 2023-03-13 ENCOUNTER — INPATIENT (INPATIENT)
Facility: HOSPITAL | Age: 63
LOS: 1 days | Discharge: HOME | DRG: 330 | End: 2023-03-15
Attending: COLON & RECTAL SURGERY | Admitting: COLON & RECTAL SURGERY
Payer: MEDICARE

## 2023-03-13 ENCOUNTER — RESULT REVIEW (OUTPATIENT)
Age: 63
End: 2023-03-13

## 2023-03-13 VITALS
HEIGHT: 72 IN | DIASTOLIC BLOOD PRESSURE: 71 MMHG | HEART RATE: 80 BPM | WEIGHT: 209 LBS | SYSTOLIC BLOOD PRESSURE: 109 MMHG | TEMPERATURE: 98 F | RESPIRATION RATE: 18 BRPM

## 2023-03-13 DIAGNOSIS — Z90.89 ACQUIRED ABSENCE OF OTHER ORGANS: Chronic | ICD-10-CM

## 2023-03-13 DIAGNOSIS — Z98.890 OTHER SPECIFIED POSTPROCEDURAL STATES: Chronic | ICD-10-CM

## 2023-03-13 DIAGNOSIS — C19 MALIGNANT NEOPLASM OF RECTOSIGMOID JUNCTION: ICD-10-CM

## 2023-03-13 LAB
GLUCOSE BLDC GLUCOMTR-MCNC: 112 MG/DL — HIGH (ref 70–99)
GLUCOSE BLDC GLUCOMTR-MCNC: 81 MG/DL — SIGNIFICANT CHANGE UP (ref 70–99)

## 2023-03-13 PROCEDURE — 88309 TISSUE EXAM BY PATHOLOGIST: CPT | Mod: 26

## 2023-03-13 PROCEDURE — 88305 TISSUE EXAM BY PATHOLOGIST: CPT

## 2023-03-13 PROCEDURE — 38570 LAPAROSCOPY LYMPH NODE BIOP: CPT | Mod: AS

## 2023-03-13 PROCEDURE — 44207 L COLECTOMY/COLOPROCTOSTOMY: CPT

## 2023-03-13 PROCEDURE — 88309 TISSUE EXAM BY PATHOLOGIST: CPT

## 2023-03-13 PROCEDURE — 88305 TISSUE EXAM BY PATHOLOGIST: CPT | Mod: 26

## 2023-03-13 PROCEDURE — 84100 ASSAY OF PHOSPHORUS: CPT

## 2023-03-13 PROCEDURE — 44213 LAP MOBIL SPLENIC FL ADD-ON: CPT

## 2023-03-13 PROCEDURE — C1889: CPT

## 2023-03-13 PROCEDURE — C9290: CPT

## 2023-03-13 PROCEDURE — 83735 ASSAY OF MAGNESIUM: CPT

## 2023-03-13 PROCEDURE — 36415 COLL VENOUS BLD VENIPUNCTURE: CPT

## 2023-03-13 PROCEDURE — 44213 LAP MOBIL SPLENIC FL ADD-ON: CPT | Mod: AS

## 2023-03-13 PROCEDURE — 44207 L COLECTOMY/COLOPROCTOSTOMY: CPT | Mod: AS

## 2023-03-13 PROCEDURE — 80048 BASIC METABOLIC PNL TOTAL CA: CPT

## 2023-03-13 PROCEDURE — 38570 LAPAROSCOPY LYMPH NODE BIOP: CPT

## 2023-03-13 PROCEDURE — 45300 PROCTOSIGMOIDOSCOPY DX: CPT

## 2023-03-13 PROCEDURE — 85027 COMPLETE CBC AUTOMATED: CPT

## 2023-03-13 PROCEDURE — 82962 GLUCOSE BLOOD TEST: CPT

## 2023-03-13 RX ORDER — HYDROMORPHONE HYDROCHLORIDE 2 MG/ML
0.5 INJECTION INTRAMUSCULAR; INTRAVENOUS; SUBCUTANEOUS
Refills: 0 | Status: DISCONTINUED | OUTPATIENT
Start: 2023-03-13 | End: 2023-03-13

## 2023-03-13 RX ORDER — HEPARIN SODIUM 5000 [USP'U]/ML
5000 INJECTION INTRAVENOUS; SUBCUTANEOUS ONCE
Refills: 0 | Status: COMPLETED | OUTPATIENT
Start: 2023-03-13 | End: 2023-03-13

## 2023-03-13 RX ORDER — ALVIMOPAN 12 MG/1
12 CAPSULE ORAL EVERY 12 HOURS
Refills: 0 | Status: DISCONTINUED | OUTPATIENT
Start: 2023-03-14 | End: 2023-03-15

## 2023-03-13 RX ORDER — SODIUM CHLORIDE 9 MG/ML
1000 INJECTION, SOLUTION INTRAVENOUS ONCE
Refills: 0 | Status: COMPLETED | OUTPATIENT
Start: 2023-03-13 | End: 2023-03-13

## 2023-03-13 RX ORDER — KETOROLAC TROMETHAMINE 30 MG/ML
15 SYRINGE (ML) INJECTION EVERY 6 HOURS
Refills: 0 | Status: DISCONTINUED | OUTPATIENT
Start: 2023-03-14 | End: 2023-03-14

## 2023-03-13 RX ORDER — OLMESARTAN MEDOXOMIL-HYDROCHLOROTHIAZIDE 25; 40 MG/1; MG/1
1 TABLET, FILM COATED ORAL
Qty: 0 | Refills: 0 | DISCHARGE

## 2023-03-13 RX ORDER — CEFOTETAN DISODIUM 1 G
2 VIAL (EA) INJECTION ONCE
Refills: 0 | Status: COMPLETED | OUTPATIENT
Start: 2023-03-14 | End: 2023-03-14

## 2023-03-13 RX ORDER — TAMSULOSIN HYDROCHLORIDE 0.4 MG/1
0.4 CAPSULE ORAL AT BEDTIME
Refills: 0 | Status: DISCONTINUED | OUTPATIENT
Start: 2023-03-13 | End: 2023-03-15

## 2023-03-13 RX ORDER — SODIUM CHLORIDE 9 MG/ML
1000 INJECTION, SOLUTION INTRAVENOUS
Refills: 0 | Status: DISCONTINUED | OUTPATIENT
Start: 2023-03-13 | End: 2023-03-13

## 2023-03-13 RX ORDER — ENOXAPARIN SODIUM 100 MG/ML
40 INJECTION SUBCUTANEOUS EVERY 24 HOURS
Refills: 0 | Status: DISCONTINUED | OUTPATIENT
Start: 2023-03-14 | End: 2023-03-14

## 2023-03-13 RX ORDER — ALVIMOPAN 12 MG/1
12 CAPSULE ORAL ONCE
Refills: 0 | Status: COMPLETED | OUTPATIENT
Start: 2023-03-13 | End: 2023-03-13

## 2023-03-13 RX ORDER — INFLUENZA VIRUS VACCINE 15; 15; 15; 15 UG/.5ML; UG/.5ML; UG/.5ML; UG/.5ML
0.7 SUSPENSION INTRAMUSCULAR ONCE
Refills: 0 | Status: COMPLETED | OUTPATIENT
Start: 2023-03-13 | End: 2023-03-13

## 2023-03-13 RX ORDER — SODIUM CHLORIDE 9 MG/ML
1000 INJECTION, SOLUTION INTRAVENOUS
Refills: 0 | Status: DISCONTINUED | OUTPATIENT
Start: 2023-03-13 | End: 2023-03-15

## 2023-03-13 RX ORDER — ONDANSETRON 8 MG/1
4 TABLET, FILM COATED ORAL ONCE
Refills: 0 | Status: DISCONTINUED | OUTPATIENT
Start: 2023-03-13 | End: 2023-03-13

## 2023-03-13 RX ORDER — OXYCODONE HYDROCHLORIDE 5 MG/1
5 TABLET ORAL EVERY 4 HOURS
Refills: 0 | Status: DISCONTINUED | OUTPATIENT
Start: 2023-03-13 | End: 2023-03-15

## 2023-03-13 RX ORDER — ACETAMINOPHEN 500 MG
650 TABLET ORAL EVERY 6 HOURS
Refills: 0 | Status: DISCONTINUED | OUTPATIENT
Start: 2023-03-13 | End: 2023-03-15

## 2023-03-13 RX ORDER — ONDANSETRON 8 MG/1
4 TABLET, FILM COATED ORAL EVERY 6 HOURS
Refills: 0 | Status: DISCONTINUED | OUTPATIENT
Start: 2023-03-13 | End: 2023-03-15

## 2023-03-13 RX ORDER — LOSARTAN POTASSIUM 100 MG/1
50 TABLET, FILM COATED ORAL DAILY
Refills: 0 | Status: DISCONTINUED | OUTPATIENT
Start: 2023-03-13 | End: 2023-03-14

## 2023-03-13 RX ORDER — FENOFIBRATE,MICRONIZED 130 MG
1 CAPSULE ORAL
Qty: 0 | Refills: 0 | DISCHARGE

## 2023-03-13 RX ORDER — TAMSULOSIN HYDROCHLORIDE 0.4 MG/1
1 CAPSULE ORAL
Qty: 0 | Refills: 0 | DISCHARGE

## 2023-03-13 RX ORDER — OXYCODONE HYDROCHLORIDE 5 MG/1
10 TABLET ORAL EVERY 4 HOURS
Refills: 0 | Status: DISCONTINUED | OUTPATIENT
Start: 2023-03-13 | End: 2023-03-15

## 2023-03-13 RX ORDER — ACETAMINOPHEN 500 MG
1000 TABLET ORAL EVERY 6 HOURS
Refills: 0 | Status: COMPLETED | OUTPATIENT
Start: 2023-03-14 | End: 2023-03-14

## 2023-03-13 RX ORDER — ASPIRIN/CALCIUM CARB/MAGNESIUM 324 MG
1 TABLET ORAL
Qty: 0 | Refills: 0 | DISCHARGE

## 2023-03-13 RX ADMIN — ALVIMOPAN 12 MILLIGRAM(S): 12 CAPSULE ORAL at 13:08

## 2023-03-13 RX ADMIN — SODIUM CHLORIDE 1000 MILLILITER(S): 9 INJECTION, SOLUTION INTRAVENOUS at 22:45

## 2023-03-13 RX ADMIN — TAMSULOSIN HYDROCHLORIDE 0.4 MILLIGRAM(S): 0.4 CAPSULE ORAL at 22:10

## 2023-03-13 RX ADMIN — HEPARIN SODIUM 5000 UNIT(S): 5000 INJECTION INTRAVENOUS; SUBCUTANEOUS at 13:08

## 2023-03-13 RX ADMIN — SODIUM CHLORIDE 125 MILLILITER(S): 9 INJECTION, SOLUTION INTRAVENOUS at 20:57

## 2023-03-13 RX ADMIN — SODIUM CHLORIDE 125 MILLILITER(S): 9 INJECTION, SOLUTION INTRAVENOUS at 19:32

## 2023-03-13 NOTE — BRIEF OPERATIVE NOTE - OPERATION/FINDINGS
identified tattooed area at rectosigmoid junction  laparoscopic hand assisted low anterior resection w/ splenic mobilization  end-to-end colorectal anastomosis with adequate margin  leak test negative

## 2023-03-13 NOTE — BRIEF OPERATIVE NOTE - NSICDXBRIEFPROCEDURE_GEN_ALL_CORE_FT
PROCEDURES:  Laparoscopic hand-assisted low anterior resection 13-Mar-2023 18:03:34  Donny Leger  Insertion, biologic implant 13-Mar-2023 18:03:45  Donny Leger  Mobilization, splenic flexure 13-Mar-2023 18:04:00  Donny Leger

## 2023-03-13 NOTE — PATIENT PROFILE ADULT - FALL HARM RISK - UNIVERSAL INTERVENTIONS
Bed in lowest position, wheels locked, appropriate side rails in place/Call bell, personal items and telephone in reach/Instruct patient to call for assistance before getting out of bed or chair/Non-slip footwear when patient is out of bed/[place] to call system/Physically safe environment - no spills, clutter or unnecessary equipment/Purposeful Proactive Rounding/Room/bathroom lighting operational, light cord in reach

## 2023-03-13 NOTE — PATIENT PROFILE ADULT - TOBACCO USE
Detail Level: Detailed X Size Of Lesion In Cm (Optional): 0 Anatomic Location From Referring Provider: Left Helix Never smoker

## 2023-03-14 LAB
ANION GAP SERPL CALC-SCNC: 7 MMOL/L — SIGNIFICANT CHANGE UP (ref 5–17)
BUN SERPL-MCNC: 24 MG/DL — HIGH (ref 7–23)
CALCIUM SERPL-MCNC: 8.4 MG/DL — LOW (ref 8.5–10.1)
CHLORIDE SERPL-SCNC: 107 MMOL/L — SIGNIFICANT CHANGE UP (ref 96–108)
CO2 SERPL-SCNC: 23 MMOL/L — SIGNIFICANT CHANGE UP (ref 22–31)
CREAT SERPL-MCNC: 1.66 MG/DL — HIGH (ref 0.5–1.3)
EGFR: 45 ML/MIN/1.73M2 — LOW
GLUCOSE BLDC GLUCOMTR-MCNC: 117 MG/DL — HIGH (ref 70–99)
GLUCOSE BLDC GLUCOMTR-MCNC: 128 MG/DL — HIGH (ref 70–99)
GLUCOSE BLDC GLUCOMTR-MCNC: 140 MG/DL — HIGH (ref 70–99)
GLUCOSE BLDC GLUCOMTR-MCNC: 143 MG/DL — HIGH (ref 70–99)
GLUCOSE BLDC GLUCOMTR-MCNC: 151 MG/DL — HIGH (ref 70–99)
GLUCOSE SERPL-MCNC: 166 MG/DL — HIGH (ref 70–99)
HCT VFR BLD CALC: 39.2 % — SIGNIFICANT CHANGE UP (ref 39–50)
HGB BLD-MCNC: 13.2 G/DL — SIGNIFICANT CHANGE UP (ref 13–17)
MAGNESIUM SERPL-MCNC: 1.8 MG/DL — SIGNIFICANT CHANGE UP (ref 1.6–2.6)
MCHC RBC-ENTMCNC: 28.6 PG — SIGNIFICANT CHANGE UP (ref 27–34)
MCHC RBC-ENTMCNC: 33.7 GM/DL — SIGNIFICANT CHANGE UP (ref 32–36)
MCV RBC AUTO: 84.8 FL — SIGNIFICANT CHANGE UP (ref 80–100)
PHOSPHATE SERPL-MCNC: 2.9 MG/DL — SIGNIFICANT CHANGE UP (ref 2.5–4.5)
PLATELET # BLD AUTO: 296 K/UL — SIGNIFICANT CHANGE UP (ref 150–400)
POTASSIUM SERPL-MCNC: 3.6 MMOL/L — SIGNIFICANT CHANGE UP (ref 3.5–5.3)
POTASSIUM SERPL-SCNC: 3.6 MMOL/L — SIGNIFICANT CHANGE UP (ref 3.5–5.3)
RBC # BLD: 4.62 M/UL — SIGNIFICANT CHANGE UP (ref 4.2–5.8)
RBC # FLD: 13.2 % — SIGNIFICANT CHANGE UP (ref 10.3–14.5)
SODIUM SERPL-SCNC: 137 MMOL/L — SIGNIFICANT CHANGE UP (ref 135–145)
WBC # BLD: 11.63 K/UL — HIGH (ref 3.8–10.5)
WBC # FLD AUTO: 11.63 K/UL — HIGH (ref 3.8–10.5)

## 2023-03-14 PROCEDURE — 99221 1ST HOSP IP/OBS SF/LOW 40: CPT

## 2023-03-14 RX ORDER — SODIUM CHLORIDE 9 MG/ML
500 INJECTION INTRAMUSCULAR; INTRAVENOUS; SUBCUTANEOUS ONCE
Refills: 0 | Status: COMPLETED | OUTPATIENT
Start: 2023-03-14 | End: 2023-03-14

## 2023-03-14 RX ORDER — HEPARIN SODIUM 5000 [USP'U]/ML
5000 INJECTION INTRAVENOUS; SUBCUTANEOUS EVERY 8 HOURS
Refills: 0 | Status: DISCONTINUED | OUTPATIENT
Start: 2023-03-14 | End: 2023-03-15

## 2023-03-14 RX ADMIN — Medication 15 MILLIGRAM(S): at 05:32

## 2023-03-14 RX ADMIN — Medication 1000 MILLIGRAM(S): at 19:00

## 2023-03-14 RX ADMIN — Medication 1000 MILLIGRAM(S): at 09:00

## 2023-03-14 RX ADMIN — SODIUM CHLORIDE 125 MILLILITER(S): 9 INJECTION, SOLUTION INTRAVENOUS at 06:16

## 2023-03-14 RX ADMIN — SODIUM CHLORIDE 125 MILLILITER(S): 9 INJECTION, SOLUTION INTRAVENOUS at 22:32

## 2023-03-14 RX ADMIN — SODIUM CHLORIDE 500 MILLILITER(S): 9 INJECTION INTRAMUSCULAR; INTRAVENOUS; SUBCUTANEOUS at 13:46

## 2023-03-14 RX ADMIN — Medication 400 MILLIGRAM(S): at 18:25

## 2023-03-14 RX ADMIN — Medication 400 MILLIGRAM(S): at 08:35

## 2023-03-14 RX ADMIN — Medication 100 GRAM(S): at 06:12

## 2023-03-14 RX ADMIN — Medication 15 MILLIGRAM(S): at 00:13

## 2023-03-14 RX ADMIN — Medication 400 MILLIGRAM(S): at 13:07

## 2023-03-14 RX ADMIN — Medication 1 TABLET(S): at 10:22

## 2023-03-14 RX ADMIN — Medication 400 MILLIGRAM(S): at 02:04

## 2023-03-14 RX ADMIN — ALVIMOPAN 12 MILLIGRAM(S): 12 CAPSULE ORAL at 10:21

## 2023-03-14 RX ADMIN — HEPARIN SODIUM 5000 UNIT(S): 5000 INJECTION INTRAVENOUS; SUBCUTANEOUS at 13:07

## 2023-03-14 RX ADMIN — Medication 1000 MILLIGRAM(S): at 13:40

## 2023-03-14 RX ADMIN — SODIUM CHLORIDE 125 MILLILITER(S): 9 INJECTION, SOLUTION INTRAVENOUS at 15:55

## 2023-03-14 RX ADMIN — ENOXAPARIN SODIUM 40 MILLIGRAM(S): 100 INJECTION SUBCUTANEOUS at 02:08

## 2023-03-14 RX ADMIN — ALVIMOPAN 12 MILLIGRAM(S): 12 CAPSULE ORAL at 20:58

## 2023-03-14 RX ADMIN — TAMSULOSIN HYDROCHLORIDE 0.4 MILLIGRAM(S): 0.4 CAPSULE ORAL at 20:57

## 2023-03-14 RX ADMIN — HEPARIN SODIUM 5000 UNIT(S): 5000 INJECTION INTRAVENOUS; SUBCUTANEOUS at 20:58

## 2023-03-14 NOTE — DIETITIAN INITIAL EVALUATION ADULT - ADD RECOMMEND
1) Suggest advance diet to Clear Liquids to Full Liquids to Low Fiber, when medically feasible and as tolerated, 2) Encourage protein-rich foods, maximize food preferences, 3) add ensure max BID to maximize nutrient intake (provides 150 kcal, 30g protein/ shake), 4) MVI w/ minerals daily to ensure 100% RDA met, 5) Consider adding thiamine 100 mg daily 2/2 malnutrition, 6) Obtain vitamin D 25OH level to assess nutriture, 7) Check serum zinc level for suspected deficiency. Consider adding 220 mg of zinc sulfate daily 2/2 persistent diarrhea, 8) Monitor bowel movements, if no BM for >3 days, consider implementing bowel regimen, 9) Confirm goals of care regarding nutrition support. RD will continue to monitor PO intake, labs, hydration, and wt prn.

## 2023-03-14 NOTE — DIETITIAN INITIAL EVALUATION ADULT - ORAL INTAKE PTA/DIET HISTORY
typically eats 3 meals/ day; reports good appetite  has had cyclic diarrhea x 1 year. States does not affect appetite 2/2 takes imodium for relief that helps  NKFA; however, states milk and ice cream cause abd cramping

## 2023-03-14 NOTE — DIETITIAN INITIAL EVALUATION ADULT - PERTINENT MEDS FT
MEDICATIONS  (STANDING):  acetaminophen   IVPB .. 1000 milliGRAM(s) IV Intermittent every 6 hours  alvimopan 12 milliGRAM(s) Oral every 12 hours  heparin   Injectable 5000 Unit(s) SubCutaneous every 8 hours  lactated ringers. 1000 milliLiter(s) (125 mL/Hr) IV Continuous <Continuous>  multivitamin 1 Tablet(s) Oral daily  tamsulosin 0.4 milliGRAM(s) Oral at bedtime    MEDICATIONS  (PRN):  acetaminophen     Tablet .. 650 milliGRAM(s) Oral every 6 hours PRN Temp greater or equal to 38C (100.4F), Mild Pain (1 - 3)  ondansetron Injectable 4 milliGRAM(s) IV Push every 6 hours PRN Nausea and/or Vomiting  oxyCODONE    IR 5 milliGRAM(s) Oral every 4 hours PRN Moderate Pain (4 - 6)  oxyCODONE    IR 10 milliGRAM(s) Oral every 4 hours PRN Severe Pain (7 - 10)    Home Medications:  Aspir 81 oral delayed release tablet: 1 tab(s) orally once a day- cardio for management  (13 Mar 2023 12:48)  fenofibrate 134 mg oral capsule: 1 cap(s) orally once a day (13 Mar 2023 12:48)  Multiple Vitamins oral tablet: 1 tab(s) orally once a day (13 Mar 2023 12:48)  olmesartan-hydrochlorothiazide 20 mg-12.5 mg oral tablet: 1 tab(s) orally once a day (13 Mar 2023 12:48)  tamsulosin 0.4 mg oral capsule: 1 cap(s) orally once a day (13 Mar 2023 12:48)

## 2023-03-14 NOTE — DIETITIAN INITIAL EVALUATION ADULT - NSFNSGIIOFT_GEN_A_CORE
I&O's Detail    13 Mar 2023 07:01  -  14 Mar 2023 07:00  --------------------------------------------------------  IN:    Lactated Ringers: 250 mL    Other (mL): 2000 mL  Total IN: 2250 mL    OUT:    Indwelling Catheter - Urethral (mL): 425 mL    Other (mL): 350 mL  Total OUT: 775 mL    Total NET: 1475 mL      14 Mar 2023 07:01  -  14 Mar 2023 09:44  --------------------------------------------------------  IN:  Total IN: 0 mL    OUT:    Indwelling Catheter - Urethral (mL): 300 mL  Total OUT: 300 mL    Total NET: -300 mL

## 2023-03-14 NOTE — CONSULT NOTE ADULT - SUBJECTIVE AND OBJECTIVE BOX
PCP:  Dr nathanael Yao    CHIEF COMPLAINT: rectosigmoid polyp    HISTORY OF THE PRESENT ILLNESS:  this is a 62 yo male with pmh" extra beats."  S/p ablation, on ASA only,  BPH, HLD, HTN, who had some BRBPR and changes in bowel habits,  Colonoscopy performed, noted + villous polyp, unable to be removed via scope.  he was then referred to Dr Colon for surgery and now here for planned LAR.  Pt is seen on 1 north, OOb to chair, wife into visit.  Terri FLD,  no bowel function as of yet.  denies any cp or sob, has been up ambulating in claros, denies any dizziness or lightheadedness.  We are consulted for medical management    PAST MEDICAL HISTORY: as above    PAST SURGICAL HISTORY: colonoscopies x 3, right knee    FAMILY HISTORY:  mother: dec: brain tumor,  father: dementia, Parkinsons    SOCIAL HISTORY:  no smoking, no alcohol, no drugs    ALLERGIES: NKDA    HOME MEDS: see med rec    REVIEW OF SYSTEMS:   All 10 systems reviewed in detailed and found to be negative with the exception of what has already been described above    MEDICATIONS  (STANDING):  acetaminophen   IVPB .. 1000 milliGRAM(s) IV Intermittent every 6 hours  alvimopan 12 milliGRAM(s) Oral every 12 hours  heparin   Injectable 5000 Unit(s) SubCutaneous every 8 hours  lactated ringers. 1000 milliLiter(s) (125 mL/Hr) IV Continuous <Continuous>  multivitamin 1 Tablet(s) Oral daily  sodium chloride 0.9% Bolus 500 milliLiter(s) IV Bolus once  tamsulosin 0.4 milliGRAM(s) Oral at bedtime    MEDICATIONS  (PRN):  acetaminophen     Tablet .. 650 milliGRAM(s) Oral every 6 hours PRN Temp greater or equal to 38C (100.4F), Mild Pain (1 - 3)  ondansetron Injectable 4 milliGRAM(s) IV Push every 6 hours PRN Nausea and/or Vomiting  oxyCODONE    IR 5 milliGRAM(s) Oral every 4 hours PRN Moderate Pain (4 - 6)  oxyCODONE    IR 10 milliGRAM(s) Oral every 4 hours PRN Severe Pain (7 - 10)      VITALS:  T(F): 98.4 (03-14-23 @ 09:08), Max: 99.7 (03-14-23 @ 01:40)  HR: 92 (03-14-23 @ 09:08) (81 - 105)  BP: 118/72 (03-14-23 @ 09:08) (100/54 - 138/68)  RR: 18 (03-14-23 @ 09:08) (13 - 20)  SpO2: 95% (03-14-23 @ 09:08) (91% - 98%)  Wt(kg): --    I&O's Summary    13 Mar 2023 07:01  -  14 Mar 2023 07:00  --------------------------------------------------------  IN: 2250 mL / OUT: 775 mL / NET: 1475 mL    14 Mar 2023 07:01  -  14 Mar 2023 13:24  --------------------------------------------------------  IN: 0 mL / OUT: 300 mL / NET: -300 mL        CAPILLARY BLOOD GLUCOSE      POCT Blood Glucose.: 140 mg/dL (14 Mar 2023 11:54)  POCT Blood Glucose.: 151 mg/dL (14 Mar 2023 06:36)  POCT Blood Glucose.: 143 mg/dL (13 Mar 2023 23:59)  POCT Blood Glucose.: 112 mg/dL (13 Mar 2023 17:52)    PHYSICAL EXAM:    GENERAL: Comfortable, no acute distress   HEAD:  Normocephalic, atraumatic  EYES: EOMI, PERRLA  HEENT: Moist mucous membranes  NECK: Supple, No JVD  NERVOUS SYSTEM:  Alert & Oriented X3, Motor Strength 5/5 B/L upper and lower extremities  CHEST/LUNG: Clear to auscultation bilaterally  HEART: Regular rate and rhythm  ABDOMEN: Soft, = post op tenderness,  Nondistended, Bowel sounds hypoactive, lap sites c/d/i, +np  GENITOURINARY: Voiding, no palpable bladder  EXTREMITIES:   No clubbing, cyanosis, or edema  MUSCULOSKELETAL- No muscle tenderness, no joint tenderness  SKIN-no rash    LABS:                      13.2   11.63 )-----------( 296      ( 14 Mar 2023 06:47 )             39.2     03-14    137  |  107  |  24<H>  ----------------------------<  166<H>  3.6   |  23  |  1.66<H>    Ca    8.4<L>      14 Mar 2023 06:47  Phos  2.9     03-14  Mg     1.8     03-14                      EKG:     RADIOLOGY STUDIES:      IMPRESSION: with above pmh. a/w:    pain control  IVF's  nuñez  Monitor I& O  Monitor Cbc, BMP  BGM per CRS protocol  Cont Abxs  NPO  Enterag    #VTE prophylaxis  hep sq  Venodynes  Amb               PCP:  Dr nathanael Yao    CHIEF COMPLAINT: rectosigmoid polyp    HISTORY OF THE PRESENT ILLNESS:  this is a 64 yo male with pmh" extra beats."  S/p ablation, on ASA only,  BPH, HLD, HTN, who had some BRBPR and changes in bowel habits,  Colonoscopy performed, noted + villous polyp, unable to be removed via scope.  he was then referred to Dr Colon for surgery and now here for planned LAR.  Pt is seen on 1 north, OOb to chair, wife into visit.  Terri FLD,  no bowel function as of yet.  denies any cp or sob, has been up ambulating in claros, denies any dizziness or lightheadedness.  We are consulted for medical management    PAST MEDICAL HISTORY: as above    PAST SURGICAL HISTORY: colonoscopies x 3, right knee    FAMILY HISTORY:  mother: dec: brain tumor,  father: dementia, Parkinsons    SOCIAL HISTORY:  no smoking, no alcohol, no drugs    ALLERGIES: NKDA    HOME MEDS: see med rec    REVIEW OF SYSTEMS:   All 10 systems reviewed in detailed and found to be negative with the exception of what has already been described above    MEDICATIONS  (STANDING):  acetaminophen   IVPB .. 1000 milliGRAM(s) IV Intermittent every 6 hours  alvimopan 12 milliGRAM(s) Oral every 12 hours  heparin   Injectable 5000 Unit(s) SubCutaneous every 8 hours  lactated ringers. 1000 milliLiter(s) (125 mL/Hr) IV Continuous <Continuous>  multivitamin 1 Tablet(s) Oral daily  sodium chloride 0.9% Bolus 500 milliLiter(s) IV Bolus once  tamsulosin 0.4 milliGRAM(s) Oral at bedtime    MEDICATIONS  (PRN):  acetaminophen     Tablet .. 650 milliGRAM(s) Oral every 6 hours PRN Temp greater or equal to 38C (100.4F), Mild Pain (1 - 3)  ondansetron Injectable 4 milliGRAM(s) IV Push every 6 hours PRN Nausea and/or Vomiting  oxyCODONE    IR 5 milliGRAM(s) Oral every 4 hours PRN Moderate Pain (4 - 6)  oxyCODONE    IR 10 milliGRAM(s) Oral every 4 hours PRN Severe Pain (7 - 10)      VITALS:  T(F): 98.4 (03-14-23 @ 09:08), Max: 99.7 (03-14-23 @ 01:40)  HR: 92 (03-14-23 @ 09:08) (81 - 105)  BP: 118/72 (03-14-23 @ 09:08) (100/54 - 138/68)  RR: 18 (03-14-23 @ 09:08) (13 - 20)  SpO2: 95% (03-14-23 @ 09:08) (91% - 98%)  Wt(kg): --    I&O's Summary    13 Mar 2023 07:01  -  14 Mar 2023 07:00  --------------------------------------------------------  IN: 2250 mL / OUT: 775 mL / NET: 1475 mL    14 Mar 2023 07:01  -  14 Mar 2023 13:24  --------------------------------------------------------  IN: 0 mL / OUT: 300 mL / NET: -300 mL        CAPILLARY BLOOD GLUCOSE      POCT Blood Glucose.: 140 mg/dL (14 Mar 2023 11:54)  POCT Blood Glucose.: 151 mg/dL (14 Mar 2023 06:36)  POCT Blood Glucose.: 143 mg/dL (13 Mar 2023 23:59)  POCT Blood Glucose.: 112 mg/dL (13 Mar 2023 17:52)    PHYSICAL EXAM:    GENERAL: Comfortable, no acute distress   HEAD:  Normocephalic, atraumatic  EYES: EOMI, PERRLA  HEENT: Moist mucous membranes  NECK: Supple, No JVD  NERVOUS SYSTEM:  Alert & Oriented X3, Motor Strength 5/5 B/L upper and lower extremities  CHEST/LUNG: Clear to auscultation bilaterally  HEART: Regular rate and rhythm  ABDOMEN: Soft, +post op tenderness,  Nondistended, Bowel sounds hypoactive, lap sites c/d/i, +np seal in place, No bowel function  GENITOURINARY: nuñez  EXTREMITIES:   No clubbing, cyanosis, or edema  MUSCULOSKELETAL- No muscle tenderness, no joint tenderness  SKIN-no rash    LABS:                      13.2   11.63 )-----------( 296      ( 14 Mar 2023 06:47 )             39.2     03-14    137  |  107  |  24<H>  ----------------------------<  166<H>  3.6   |  23  |  1.66<H>    Ca    8.4<L>      14 Mar 2023 06:47  Phos  2.9     03-14  Mg     1.8     03-14        IMPRESSION: 64 yo male with above pmh a/w:    # large villous polyp  #laparoscopic low anterior resection  pain control  IVF's  nuñez  Monitor I& O  Monitor Cbc, BMP  BGM per CRS protocol  Cont Abxs  diet adv per cRS  Enterag    #SHONA  likely prerenal from hypovolemia  looks dry  IVF bolus  hold losartan  avoid nephrotoxic agents    #BPH  cont flomax    #HTN   on losartan at home   being held here 2/2 hypotension/ SHONA  can resume if B/P stable   hold hctz    #HLD  resume fenofibrate when d/c home    #VTE prophylaxis  hep sq  Venodynes  Amb               PCP:  Dr nathanael Yao    CHIEF COMPLAINT: rectosigmoid polyp    HISTORY OF THE PRESENT ILLNESS:  this is a 64 yo male with pmh" extra beats."  S/p ablation, on ASA only,  BPH, HLD, HTN, who had some BRBPR and changes in bowel habits,  Colonoscopy performed, noted + villous polyp, unable to be removed via scope.  he was then referred to Dr Colon for surgery and now here for planned LAR.  Pt is seen on 1 north, OOb to chair, wife into visit.  Terri FLD,  no bowel function as of yet.  denies any cp or sob, has been up ambulating in claros, denies any dizziness or lightheadedness.  We are consulted for medical management    PAST MEDICAL HISTORY: as above    PAST SURGICAL HISTORY: colonoscopies x 3, right knee    FAMILY HISTORY:  mother: dec: brain tumor,  father: dementia, Parkinsons    SOCIAL HISTORY:  no smoking, no alcohol, no drugs    ALLERGIES: NKDA    HOME MEDS: see med rec    REVIEW OF SYSTEMS:   All 10 systems reviewed in detailed and found to be negative with the exception of what has already been described above    MEDICATIONS  (STANDING):  acetaminophen   IVPB .. 1000 milliGRAM(s) IV Intermittent every 6 hours  alvimopan 12 milliGRAM(s) Oral every 12 hours  heparin   Injectable 5000 Unit(s) SubCutaneous every 8 hours  lactated ringers. 1000 milliLiter(s) (125 mL/Hr) IV Continuous <Continuous>  multivitamin 1 Tablet(s) Oral daily  sodium chloride 0.9% Bolus 500 milliLiter(s) IV Bolus once  tamsulosin 0.4 milliGRAM(s) Oral at bedtime    MEDICATIONS  (PRN):  acetaminophen     Tablet .. 650 milliGRAM(s) Oral every 6 hours PRN Temp greater or equal to 38C (100.4F), Mild Pain (1 - 3)  ondansetron Injectable 4 milliGRAM(s) IV Push every 6 hours PRN Nausea and/or Vomiting  oxyCODONE    IR 5 milliGRAM(s) Oral every 4 hours PRN Moderate Pain (4 - 6)  oxyCODONE    IR 10 milliGRAM(s) Oral every 4 hours PRN Severe Pain (7 - 10)      VITALS:  T(F): 98.4 (03-14-23 @ 09:08), Max: 99.7 (03-14-23 @ 01:40)  HR: 92 (03-14-23 @ 09:08) (81 - 105)  BP: 118/72 (03-14-23 @ 09:08) (100/54 - 138/68)  RR: 18 (03-14-23 @ 09:08) (13 - 20)  SpO2: 95% (03-14-23 @ 09:08) (91% - 98%)        PHYSICAL EXAM:    GENERAL: Comfortable, no acute distress   HEAD:  Normocephalic, atraumatic  EYES: EOMI, PERRLA  HEENT: Moist mucous membranes  NECK: Supple, No JVD  NERVOUS SYSTEM:  Alert & Oriented X3, Motor Strength 5/5 B/L upper and lower extremities  CHEST/LUNG: Clear to auscultation bilaterally  HEART: Regular rate and rhythm  ABDOMEN: Soft, +post op tenderness,  Nondistended, Bowel sounds hypoactive, lap sites c/d/i, +np seal in place, No bowel function  GENITOURINARY: nuñez  EXTREMITIES:   No clubbing, cyanosis, or edema  MUSCULOSKELETAL- No muscle tenderness, no joint tenderness  SKIN-no rash    LABS:                      13.2   11.63 )-----------( 296      ( 14 Mar 2023 06:47 )             39.2     03-14    137  |  107  |  24<H>  ----------------------------<  166<H>  3.6   |  23  |  1.66<H>    Ca    8.4<L>      14 Mar 2023 06:47  Phos  2.9     03-14  Mg     1.8     03-14        IMPRESSION: 64 yo male with above pmh a/w:    # large villous polyp  #laparoscopic low anterior resection  pain control  IVF's  nuñez  Monitor I& O  Monitor Cbc, BMP  BGM per CRS protocol  Cont Abxs  diet adv per cRS  Enterag    #SHONA  likely prerenal from hypovolemia  looks dry  IVF bolus  hold losartan  avoid nephrotoxic agents    #BPH  cont flomax    #HTN   on losartan at home   being held here 2/2 hypotension/ SHONA  can resume if B/P stable   hold hctz    #HLD  resume fenofibrate when d/c home    #VTE prophylaxis  hep sq  Venodynes  Amb

## 2023-03-14 NOTE — DIETITIAN NUTRITION RISK NOTIFICATION - TREATMENT: THE FOLLOWING DIET HAS BEEN RECOMMENDED
Diet, Clear Liquid:   Ensure Clear Cans or Servings Per Day:  1       Frequency:  Three Times a day     Special Instructions for Nursing:  To include 1 Ensure clear, 1 Lemon Ice, 2 cups of hot water to make tea or broth.  Caffeinated coffee is permissible (03-13-23 @ 18:25) [Active]

## 2023-03-14 NOTE — DIETITIAN INITIAL EVALUATION ADULT - OTHER INFO
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 s/p laparoscopic assisted low anterior resection POD#1. On CLD, tolerating well and no bowel function.    Pt in chair at time of RD visit, waiting for CLD breakfast tray. Endorses good appetite. Appears appropriate wt for ht with NFPE revealing mild to moderate muscle/ fat wasting; meets criteria for PCM. Reports # back in Sept 2022, stated he began losing wt, down to 205#, but then started taking imodium for diarrhea relief and gained some wt and has been stable at 210# now. Admit wt 209#. Suggest advance diet to Clear Liquids to Full Liquids to Low Fiber, when medically feasible and as tolerated - RD encouraged protein-rich foods. See other recs below.

## 2023-03-14 NOTE — PROGRESS NOTE ADULT - SUBJECTIVE AND OBJECTIVE BOX
SURGERY DAILY PROGRESS NOTE:     Subjective:  Patient seen and examined this AM at bedside. No acute events overnight and patient resting comfortably. Low urine output overnight. Didn't drink much overnight, but tolerated clears. No bowel function. Pain well controlled. Denies fever/chills, shortness of breath, chest pain. VS reviewed    Objective:    MEDICATIONS  (STANDING):  acetaminophen   IVPB .. 1000 milliGRAM(s) IV Intermittent every 6 hours  alvimopan 12 milliGRAM(s) Oral every 12 hours  heparin   Injectable 5000 Unit(s) SubCutaneous every 8 hours  lactated ringers. 1000 milliLiter(s) (125 mL/Hr) IV Continuous <Continuous>  multivitamin 1 Tablet(s) Oral daily  tamsulosin 0.4 milliGRAM(s) Oral at bedtime    MEDICATIONS  (PRN):  acetaminophen     Tablet .. 650 milliGRAM(s) Oral every 6 hours PRN Temp greater or equal to 38C (100.4F), Mild Pain (1 - 3)  ondansetron Injectable 4 milliGRAM(s) IV Push every 6 hours PRN Nausea and/or Vomiting  oxyCODONE    IR 5 milliGRAM(s) Oral every 4 hours PRN Moderate Pain (4 - 6)  oxyCODONE    IR 10 milliGRAM(s) Oral every 4 hours PRN Severe Pain (7 - 10)      Vital Signs Last 24 Hrs  T(C): 37.2 (14 Mar 2023 05:20), Max: 37.6 (14 Mar 2023 01:40)  T(F): 99 (14 Mar 2023 05:20), Max: 99.7 (14 Mar 2023 01:40)  HR: 92 (14 Mar 2023 05:20) (80 - 105)  BP: 105/59 (14 Mar 2023 05:20) (100/54 - 138/68)  BP(mean): --  RR: 18 (14 Mar 2023 05:20) (13 - 20)  SpO2: 92% (14 Mar 2023 05:20) (91% - 98%)    Parameters below as of 14 Mar 2023 05:20  Patient On (Oxygen Delivery Method): nasal cannula  O2 Flow (L/min): 2        PHYSICAL EXAM   GENERAL: NAD, AOx3, well developed  HEAD: Atraumatic, normocephalic  EYES: EOMI, PERRLA, conjunctiva and sclera clear  ENT: moist mucous membrane  NECK: supple, No JVD, midline trachea  CHEST/LUNG: unlabored respirations b/l  Heart: S1, S2 normal  ABDOMEN: soft, nondistended, mild tenderness to palpation, surgical sites c/d/i  NERVOUS SYSTEM: AOx3, speech clear, no neuro-deficits  MSK: full ROM, no deformities  SKIN: warm to touch, no rash or lesions      I&O's Detail    13 Mar 2023 07:01  -  14 Mar 2023 07:00  --------------------------------------------------------  IN:    Lactated Ringers: 250 mL    Other (mL): 2000 mL  Total IN: 2250 mL    OUT:    Indwelling Catheter - Urethral (mL): 425 mL    Other (mL): 350 mL  Total OUT: 775 mL    Total NET: 1475 mL          Daily Height in cm: 182.88 (13 Mar 2023 12:31)    Daily     LABS:                        13.2   11.63 )-----------( 296      ( 14 Mar 2023 06:47 )             39.2     03-14    137  |  107  |  24<H>  ----------------------------<  166<H>  3.6   |  23  |  1.66<H>    Ca    8.4<L>      14 Mar 2023 06:47  Phos  2.9     03-14  Mg     1.8     03-14            RADIOLOGY & ADDITIONAL STUDIES:

## 2023-03-14 NOTE — DIETITIAN INITIAL EVALUATION ADULT - NS FNS DIET ORDER
Diet, Clear Liquid:   Ensure Clear Cans or Servings Per Day:  1       Frequency:  Three Times a day     Special Instructions for Nursing:  To include 1 Ensure clear, 1 Lemon Ice, 2 cups of hot water to make tea or broth.  Caffeinated coffee is permissible (03-13-23 @ 18:25)

## 2023-03-14 NOTE — DIETITIAN INITIAL EVALUATION ADULT - PERTINENT LABORATORY DATA
03-14    137  |  107  |  24<H>  ----------------------------<  166<H>  3.6   |  23  |  1.66<H>    Ca    8.4<L>      14 Mar 2023 06:47  Phos  2.9     03-14  Mg     1.8     03-14    POCT Blood Glucose.: 151 mg/dL (03-14-23 @ 06:36)  A1C with Estimated Average Glucose Result: 6.0 % (03-03-23 @ 13:43)

## 2023-03-14 NOTE — CONSULT NOTE ADULT - NS ATTEND AMEND GEN_ALL_CORE FT
Pt seen and examined. DW NP Nga Bentley. Agree with documentation as above with changes made where appropriate.   63M, pmh of PVCs s/p ablation, HTN, HLD, BPH, found to have a large polyp on colonoscopy which was not removable via scope.   He is admitted for LAR.   -liquid diet  -pain control  -ambulate.   -incentive spirometry  -hold arb/diuretic for blanka  -dc toradol.   -ivf.   -avoid nephrotoxics.   -DVT px    thanks, will follow

## 2023-03-14 NOTE — PROGRESS NOTE ADULT - ATTENDING COMMENTS
Patient seen and examined. He  is POD 1 from LAR and doing well. Pain is well controlled. Tolerating liquids. no bowel function yet. Ambulating. BP in the 90s but not tachycardic. Hgb stable. No dizziness or light headedness. Cr up to 1.66. Continue nuñez, IVFs, pain control. Advance to full liquids. Monitor urine output

## 2023-03-15 ENCOUNTER — TRANSCRIPTION ENCOUNTER (OUTPATIENT)
Age: 63
End: 2023-03-15

## 2023-03-15 VITALS
TEMPERATURE: 98 F | DIASTOLIC BLOOD PRESSURE: 68 MMHG | HEART RATE: 78 BPM | OXYGEN SATURATION: 96 % | SYSTOLIC BLOOD PRESSURE: 106 MMHG | RESPIRATION RATE: 18 BRPM

## 2023-03-15 LAB
ANION GAP SERPL CALC-SCNC: 3 MMOL/L — LOW (ref 5–17)
BUN SERPL-MCNC: 22 MG/DL — SIGNIFICANT CHANGE UP (ref 7–23)
CALCIUM SERPL-MCNC: 8.4 MG/DL — LOW (ref 8.5–10.1)
CHLORIDE SERPL-SCNC: 110 MMOL/L — HIGH (ref 96–108)
CO2 SERPL-SCNC: 27 MMOL/L — SIGNIFICANT CHANGE UP (ref 22–31)
CREAT SERPL-MCNC: 1.12 MG/DL — SIGNIFICANT CHANGE UP (ref 0.5–1.3)
EGFR: 73 ML/MIN/1.73M2 — SIGNIFICANT CHANGE UP
GLUCOSE BLDC GLUCOMTR-MCNC: 102 MG/DL — HIGH (ref 70–99)
GLUCOSE BLDC GLUCOMTR-MCNC: 116 MG/DL — HIGH (ref 70–99)
GLUCOSE SERPL-MCNC: 102 MG/DL — HIGH (ref 70–99)
HCT VFR BLD CALC: 35.6 % — LOW (ref 39–50)
HGB BLD-MCNC: 11.8 G/DL — LOW (ref 13–17)
MAGNESIUM SERPL-MCNC: 2 MG/DL — SIGNIFICANT CHANGE UP (ref 1.6–2.6)
MCHC RBC-ENTMCNC: 28.5 PG — SIGNIFICANT CHANGE UP (ref 27–34)
MCHC RBC-ENTMCNC: 33.1 GM/DL — SIGNIFICANT CHANGE UP (ref 32–36)
MCV RBC AUTO: 86 FL — SIGNIFICANT CHANGE UP (ref 80–100)
PHOSPHATE SERPL-MCNC: 1.6 MG/DL — LOW (ref 2.5–4.5)
PLATELET # BLD AUTO: 259 K/UL — SIGNIFICANT CHANGE UP (ref 150–400)
POTASSIUM SERPL-MCNC: 3.6 MMOL/L — SIGNIFICANT CHANGE UP (ref 3.5–5.3)
POTASSIUM SERPL-SCNC: 3.6 MMOL/L — SIGNIFICANT CHANGE UP (ref 3.5–5.3)
RBC # BLD: 4.14 M/UL — LOW (ref 4.2–5.8)
RBC # FLD: 13.6 % — SIGNIFICANT CHANGE UP (ref 10.3–14.5)
SODIUM SERPL-SCNC: 140 MMOL/L — SIGNIFICANT CHANGE UP (ref 135–145)
WBC # BLD: 9.5 K/UL — SIGNIFICANT CHANGE UP (ref 3.8–10.5)
WBC # FLD AUTO: 9.5 K/UL — SIGNIFICANT CHANGE UP (ref 3.8–10.5)

## 2023-03-15 PROCEDURE — 99233 SBSQ HOSP IP/OBS HIGH 50: CPT

## 2023-03-15 RX ORDER — OXYCODONE AND ACETAMINOPHEN 5; 325 MG/1; MG/1
1 TABLET ORAL
Qty: 20 | Refills: 0
Start: 2023-03-15

## 2023-03-15 RX ORDER — SODIUM,POTASSIUM PHOSPHATES 278-250MG
1 POWDER IN PACKET (EA) ORAL EVERY 4 HOURS
Refills: 0 | Status: COMPLETED | OUTPATIENT
Start: 2023-03-15 | End: 2023-03-15

## 2023-03-15 RX ADMIN — HEPARIN SODIUM 5000 UNIT(S): 5000 INJECTION INTRAVENOUS; SUBCUTANEOUS at 13:57

## 2023-03-15 RX ADMIN — Medication 650 MILLIGRAM(S): at 10:23

## 2023-03-15 RX ADMIN — Medication 1 TABLET(S): at 13:57

## 2023-03-15 RX ADMIN — Medication 1 TABLET(S): at 10:23

## 2023-03-15 RX ADMIN — Medication 650 MILLIGRAM(S): at 10:53

## 2023-03-15 RX ADMIN — Medication 1 TABLET(S): at 10:24

## 2023-03-15 RX ADMIN — HEPARIN SODIUM 5000 UNIT(S): 5000 INJECTION INTRAVENOUS; SUBCUTANEOUS at 05:31

## 2023-03-15 RX ADMIN — ALVIMOPAN 12 MILLIGRAM(S): 12 CAPSULE ORAL at 10:23

## 2023-03-15 RX ADMIN — SODIUM CHLORIDE 125 MILLILITER(S): 9 INJECTION, SOLUTION INTRAVENOUS at 05:35

## 2023-03-15 NOTE — DISCHARGE NOTE PROVIDER - HOSPITAL COURSE
Patient was admitted under the surgical service. Patient was taken to the OR for a laparoscopic low anterior resection. Patient tolerated the procedure well. Diet was advanced as tolerated. Pain was controlled.  Blood work and vital signs were monitored throughout. Out of bed/ambulation and incentive spirometry were encouraged. Patient was stable for discharge home.

## 2023-03-15 NOTE — PROGRESS NOTE ADULT - SUBJECTIVE AND OBJECTIVE BOX
SURGERY DAILY PROGRESS NOTE:     Subjective:  Patient seen and examined at bedside during am rounds. No acute events overnight and patient resting comfortably. Urine output improved 2450 in past 24hr in nuñez. Tolerated a full liquid diet. Passed flatus but no gas. Pain well controlled. AVSS. Denies any fevers, chills, n/v/d, chest pain or shortness of breath    Objective:  Vital Signs Last 24 Hrs  T(C): 36.4 (14 Mar 2023 21:10), Max: 36.9 (14 Mar 2023 09:08)  T(F): 97.6 (14 Mar 2023 21:10), Max: 98.4 (14 Mar 2023 09:08)  HR: 68 (14 Mar 2023 21:10) (68 - 92)  BP: 115/57 (14 Mar 2023 21:10) (95/51 - 118/72)  BP(mean): --  RR: 18 (14 Mar 2023 21:10) (18 - 18)  SpO2: 95% (14 Mar 2023 21:10) (95% - 95%)    Parameters below as of 14 Mar 2023 21:10  Patient On (Oxygen Delivery Method): room air        I&O's Detail    14 Mar 2023 07:01  -  15 Mar 2023 07:00  --------------------------------------------------------  IN:  Total IN: 0 mL    OUT:    Indwelling Catheter - Urethral (mL): 2450 mL  Total OUT: 2450 mL    Total NET: -2450 mL          Daily     Daily     PHYSICAL EXAM   Gen: well-appearing, in no acute distress  CV: pulse regularly present   Resp: airway patent, non-labored breathing  Abd: soft, NTND; no rebound or guarding. Incision c/d/i    MEDICATIONS  (STANDING):  alvimopan 12 milliGRAM(s) Oral every 12 hours  heparin   Injectable 5000 Unit(s) SubCutaneous every 8 hours  lactated ringers. 1000 milliLiter(s) (125 mL/Hr) IV Continuous <Continuous>  multivitamin 1 Tablet(s) Oral daily  tamsulosin 0.4 milliGRAM(s) Oral at bedtime    MEDICATIONS  (PRN):  acetaminophen     Tablet .. 650 milliGRAM(s) Oral every 6 hours PRN Temp greater or equal to 38C (100.4F), Mild Pain (1 - 3)  ondansetron Injectable 4 milliGRAM(s) IV Push every 6 hours PRN Nausea and/or Vomiting  oxyCODONE    IR 5 milliGRAM(s) Oral every 4 hours PRN Moderate Pain (4 - 6)  oxyCODONE    IR 10 milliGRAM(s) Oral every 4 hours PRN Severe Pain (7 - 10)        LABS:                        11.8   9.50  )-----------( 259      ( 15 Mar 2023 07:28 )             35.6     03-14    137  |  107  |  24<H>  ----------------------------<  166<H>  3.6   |  23  |  1.66<H>    Ca    8.4<L>      14 Mar 2023 06:47  Phos  2.9     03-14  Mg     1.8     03-14

## 2023-03-15 NOTE — PROGRESS NOTE ADULT - SUBJECTIVE AND OBJECTIVE BOX
PCP:  Dr nathanael Yao    CHIEF COMPLAINT: rectosigmoid polyp    HISTORY OF THE PRESENT ILLNESS:  this is a 64 yo male with pmh" extra beats."  S/p ablation, on ASA only,  BPH, HLD, HTN, who had some BRBPR and changes in bowel habits,  Colonoscopy performed, noted + villous polyp, unable to be removed via scope.  He was then referred to Dr Colon for surgery and now here for planned LAR.  We are consulted for medical management    pt seen oob to chair, toya LRD, no n/v,  savannah has been d/c'd and he is voiding, + flatus, no BM as of yet, amb around unit without any lightheadedness or dizziness, no cp or sob    REVIEW OF SYSTEMS:   All 10 systems reviewed in detailed and found to be negative with the exception of what has already been described above    MEDICATIONS  (STANDING):  alvimopan 12 milliGRAM(s) Oral every 12 hours  heparin   Injectable 5000 Unit(s) SubCutaneous every 8 hours  multivitamin 1 Tablet(s) Oral daily  potassium phosphate / sodium phosphate Tablet (K-PHOS No. 2) 1 Tablet(s) Oral every 4 hours  tamsulosin 0.4 milliGRAM(s) Oral at bedtime    MEDICATIONS  (PRN):  acetaminophen     Tablet .. 650 milliGRAM(s) Oral every 6 hours PRN Temp greater or equal to 38C (100.4F), Mild Pain (1 - 3)  ondansetron Injectable 4 milliGRAM(s) IV Push every 6 hours PRN Nausea and/or Vomiting  oxyCODONE    IR 5 milliGRAM(s) Oral every 4 hours PRN Moderate Pain (4 - 6)  oxyCODONE    IR 10 milliGRAM(s) Oral every 4 hours PRN Severe Pain (7 - 10)    Vital Signs Last 24 Hrs  T(C): 37.1 (03-15-23 @ 08:20), Max: 37.1 (03-15-23 @ 08:20)  T(F): 98.7 (03-15-23 @ 08:20), Max: 98.7 (03-15-23 @ 08:20)  HR: 76 (03-15-23 @ 08:20) (68 - 78)  BP: 110/66 (03-15-23 @ 08:20) (95/51 - 115/57)  BP(mean): --  RR: 18 (03-15-23 @ 08:20) (18 - 18)  SpO2: 95% (03-15-23 @ 08:20) (95% - 95%)      PHYSICAL EXAM:    GENERAL: Comfortable, no acute distress   HEAD:  Normocephalic, atraumatic  EYES: EOMI, PERRLA  HEENT: Moist mucous membranes  NECK: Supple, No JVD  NERVOUS SYSTEM:  Alert & Oriented X3, Motor Strength 5/5 B/L upper and lower extremities  CHEST/LUNG: Clear to auscultation bilaterally  HEART: Regular rate and rhythm  ABDOMEN: Soft, +post op tenderness,  Nondistended, Bowel sounds present,  lap sites c/d/i, +np seal in place, + flatus, no bm as of yet  GENITOURINARY: nuñez d/c'd, + voiding  EXTREMITIES:   No clubbing, cyanosis, or edema  MUSCULOSKELETAL- No muscle tenderness, no joint tenderness  SKIN-no rash    LABS:                                           11.8   9.50  )-----------( 259      ( 15 Mar 2023 07:28 )             35.6       03-15    140  |  110<H>  |  22  ----------------------------<  102<H>  3.6   |  27  |  1.12    Ca    8.4<L>      15 Mar 2023 07:28  Phos  1.6     03-15  Mg     2.0     03-15            IMPRESSION: 64 yo male with above pmh a/w:    # large villous polyp  #laparoscopic low anterior resection, POD#1  pain control  IVF's  nuñez d/c'd, + voiding  Monitor I& O  Monitor Cbc, BMP  BGM per CRS protocol  Cont Abxs  diet adv per cRS  Enterag    #SHONA  resolved    #BPH  cont flomax    #HTN   on losartan at home   being held here 2/2 hypotension/ SHONA   hold hctz    #HLD  resume fenofibrate when d/c home    #VTE prophylaxis  hep sq  Venodynes  Amb

## 2023-03-15 NOTE — DISCHARGE NOTE PROVIDER - DETAILS OF MALNUTRITION DIAGNOSIS/DIAGNOSES
This patient has been assessed with a concern for Malnutrition and was treated during this hospitalization for the following Nutrition diagnosis/diagnoses:     -  03/14/2023: Moderate protein-calorie malnutrition

## 2023-03-15 NOTE — PROGRESS NOTE ADULT - ASSESSMENT
62yo M presents w/ rectosigmoid polyp that was not able to be removed endoscopically, s/p laparoscopic assisted low anterior resection POD#1    Plan:  - pain control prn  - monitor VS  - diet: clears  - antiemetic control prn  - monitor BF  - continue IVF  - nuñez for monitoring urine output  - no toradol for SHONA  - DVT PPX  - ERAS  - encourage IS/OOB    Plan discussed with colorectal surgery team
64yo M w/ rectosigmoid polyp that was not able to be resected endoscopically, s/p laparoscopic assisted sigmoidectomy POD#2. Urine output improved 2450 in past 24hr in nuñez. Tolerated a full liquid diet. Passed flatus but no gas. VSS. CBC stable, chem pending.     Plan:  - low res diet for breakfast  - monitor BF  - discontinue nuñez  - possible discharge in PM  - no toradol for SHONA  - DVT PPX  - ERAS  - encourage IS/OOB    Plan discussed with Dr. Zeng.

## 2023-03-15 NOTE — DISCHARGE NOTE NURSING/CASE MANAGEMENT/SOCIAL WORK - NSDCPEFALRISK_GEN_ALL_CORE
For information on Fall & Injury Prevention, visit: [email or web address] OR  [email or web address] OR  https://www.cdc.gov/steadi/patient.html

## 2023-03-15 NOTE — DISCHARGE NOTE NURSING/CASE MANAGEMENT/SOCIAL WORK - PATIENT PORTAL LINK FT
You can access the FollowMyHealth Patient Portal offered by [facility] by registering at the following website: [email or web address]. By joining [facility]’s FollowMyHealth portal, you will also be able to view your health information using other applications (apps) compatible with our system.

## 2023-03-15 NOTE — DISCHARGE NOTE PROVIDER - NSDCCPTREATMENT_GEN_ALL_CORE_FT
PRINCIPAL PROCEDURE  Procedure: Laparoscopic hand-assisted low anterior resection  Findings and Treatment:

## 2023-03-15 NOTE — DISCHARGE NOTE PROVIDER - NSDCMRMEDTOKEN_GEN_ALL_CORE_FT
Aspir 81 oral delayed release tablet: 1 tab(s) orally once a day- cardio for management   fenofibrate 134 mg oral capsule: 1 cap(s) orally once a day  Multiple Vitamins oral tablet: 1 tab(s) orally once a day  olmesartan-hydrochlorothiazide 20 mg-12.5 mg oral tablet: 1 tab(s) orally once a day  tamsulosin 0.4 mg oral capsule: 1 cap(s) orally once a day

## 2023-03-15 NOTE — DISCHARGE NOTE PROVIDER - CARE PROVIDER_API CALL
Tevin Colon; JESSICA)  ColonRectal Surgery; Surgery  [address] [place] [address]  Phone: [phone]  Fax: [phone]  Follow Up Time: 2 weeks

## 2023-03-15 NOTE — PROGRESS NOTE ADULT - NUTRITIONAL ASSESSMENT
This patient has been assessed with a concern for Malnutrition and has been determined to have a diagnosis/diagnoses of Moderate protein-calorie malnutrition.    This patient is being managed with:   Diet Low Fiber-  Entered: Mar 15 2023  6:42AM

## 2023-03-16 ENCOUNTER — NON-APPOINTMENT (OUTPATIENT)
Age: 63
End: 2023-03-16

## 2023-03-23 ENCOUNTER — APPOINTMENT (OUTPATIENT)
Dept: FAMILY MEDICINE | Facility: CLINIC | Age: 63
End: 2023-03-23
Payer: MEDICARE

## 2023-03-23 VITALS
TEMPERATURE: 97.7 F | OXYGEN SATURATION: 96 % | HEART RATE: 86 BPM | WEIGHT: 210 LBS | DIASTOLIC BLOOD PRESSURE: 66 MMHG | BODY MASS INDEX: 28.44 KG/M2 | SYSTOLIC BLOOD PRESSURE: 104 MMHG | HEIGHT: 72 IN

## 2023-03-23 DIAGNOSIS — M65.9 SYNOVITIS AND TENOSYNOVITIS, UNSPECIFIED: ICD-10-CM

## 2023-03-23 PROCEDURE — 99214 OFFICE O/P EST MOD 30 MIN: CPT

## 2023-03-23 NOTE — ASSESSMENT
[FreeTextEntry1] : check xray \par advised nsaids and wrist brace\par f/u as needed\par cont to monitor\par if still having numbness/tingling\par advised EMG

## 2023-03-23 NOTE — HISTORY OF PRESENT ILLNESS
[FreeTextEntry8] : PT presenting for swelling and pain of right wrist. \par Started 2 days ago \par had colon resection surgery last week, has been doing a lot of resting and typing\par Does get occasional numbness and tingling down right arm if he sleeps on it for too long \par having numbness, tingling, weakness in right hand and wrist

## 2023-03-23 NOTE — PHYSICAL EXAM
[Normal] : no acute distress, well nourished, well developed and well-appearing [de-identified] : swollen joint at right wrist

## 2023-03-23 NOTE — REVIEW OF SYSTEMS
[Joint Pain] : joint pain [Negative] : Constitutional [de-identified] : numbness, tingling, weakness

## 2023-03-24 LAB — SURGICAL PATHOLOGY STUDY: SIGNIFICANT CHANGE UP

## 2023-03-29 ENCOUNTER — NON-APPOINTMENT (OUTPATIENT)
Age: 63
End: 2023-03-29

## 2023-03-29 DIAGNOSIS — E78.5 HYPERLIPIDEMIA, UNSPECIFIED: ICD-10-CM

## 2023-03-29 DIAGNOSIS — N40.0 BENIGN PROSTATIC HYPERPLASIA WITHOUT LOWER URINARY TRACT SYMPTOMS: ICD-10-CM

## 2023-03-29 DIAGNOSIS — E86.1 HYPOVOLEMIA: ICD-10-CM

## 2023-03-29 DIAGNOSIS — N17.9 ACUTE KIDNEY FAILURE, UNSPECIFIED: ICD-10-CM

## 2023-03-29 DIAGNOSIS — I95.9 HYPOTENSION, UNSPECIFIED: ICD-10-CM

## 2023-03-29 DIAGNOSIS — K62.1 RECTAL POLYP: ICD-10-CM

## 2023-03-29 DIAGNOSIS — C19 MALIGNANT NEOPLASM OF RECTOSIGMOID JUNCTION: ICD-10-CM

## 2023-03-29 DIAGNOSIS — I10 ESSENTIAL (PRIMARY) HYPERTENSION: ICD-10-CM

## 2023-03-29 DIAGNOSIS — E44.0 MODERATE PROTEIN-CALORIE MALNUTRITION: ICD-10-CM

## 2023-04-05 ENCOUNTER — APPOINTMENT (OUTPATIENT)
Dept: COLORECTAL SURGERY | Facility: CLINIC | Age: 63
End: 2023-04-05
Payer: MEDICARE

## 2023-04-05 VITALS
WEIGHT: 205 LBS | RESPIRATION RATE: 16 BRPM | HEIGHT: 72 IN | HEART RATE: 93 BPM | DIASTOLIC BLOOD PRESSURE: 62 MMHG | SYSTOLIC BLOOD PRESSURE: 100 MMHG | BODY MASS INDEX: 27.77 KG/M2

## 2023-04-05 DIAGNOSIS — Z09 ENCOUNTER FOR FOLLOW-UP EXAMINATION AFTER COMPLETED TREATMENT FOR CONDITIONS OTHER THAN MALIGNANT NEOPLASM: ICD-10-CM

## 2023-04-05 PROCEDURE — 99024 POSTOP FOLLOW-UP VISIT: CPT

## 2023-04-05 RX ORDER — METRONIDAZOLE 500 MG/1
500 TABLET ORAL
Qty: 6 | Refills: 0 | Status: COMPLETED | COMMUNITY
Start: 2023-02-21 | End: 2023-04-05

## 2023-04-05 RX ORDER — ERYTHROMYCIN 500 MG/1
500 TABLET, FILM COATED ORAL
Qty: 6 | Refills: 0 | Status: COMPLETED | COMMUNITY
Start: 2023-02-18 | End: 2023-04-05

## 2023-04-05 RX ORDER — METRONIDAZOLE 500 MG/1
500 TABLET ORAL
Qty: 6 | Refills: 0 | Status: COMPLETED | COMMUNITY
Start: 2023-02-18 | End: 2023-04-05

## 2023-04-05 RX ORDER — ERYTHROMYCIN 500 MG/1
500 TABLET, FILM COATED ORAL
Qty: 6 | Refills: 0 | Status: COMPLETED | COMMUNITY
Start: 2023-02-21 | End: 2023-04-05

## 2023-04-05 NOTE — ASSESSMENT
[FreeTextEntry1] :  62-year-old male with changes in bowel habits with increasing looseness and urgency, history of colon polyps.  Recommend CAT scan of abdomen pelvis p.o. and IV contrast CBC BMP liver function test CEA recommend colonoscopy. Risks and benefits of colonoscopy have been discussed which include but not limited to bleeding, perforation, missing a cancer or polyp occurring 5%.\par \par 4/5/23  Mr. Cardenas presents to the office for a postop visit after undergoing on 3/13/2023, lap low anterior resection for endoscopically unresectable polyp.  Overall, doing well.  Okay to liberalize diet to increase fiber intake which will help with bowel movements.  Also recommended MiraLAX nightly.  Continue to avoid heavy lifting, greater than 10 pounds, for another month.  Okay to drive.  Okay to leave [place] for [place].  Follow-up as needed.

## 2023-04-05 NOTE — PHYSICAL EXAM
[No Rash or Lesion] : No rash or lesion [Alert] : alert [Oriented to Person] : oriented to person [Oriented to Place] : oriented to place [Oriented to Time] : oriented to time [Calm] : calm [Abdomen Masses] : No abdominal masses [Abdomen Tenderness] : ~T No ~M abdominal tenderness [Tender] : nontender [de-identified] : Incision clean/dry/intact; soft, nontender and nondistended [de-identified] : Looks well in no distress, of stated age. [de-identified] : Normocephalic atraumatic [de-identified] : Moving all extremities x4

## 2023-04-05 NOTE — HISTORY OF PRESENT ILLNESS
[FreeTextEntry1] : 62-year-old male with previous colon polyps now with changes in bowel habits with increasing loose bowel movements and urgency, symptoms have been worsening over the last 6 months\par \par 4/5/23 Mr. Cardenas presents to the office for a postop visit after undergoing on 3/13/2023, lap low anterior resection for endoscopically unresectable polyp.  Overall, he is feeling well and is tolerating p.o. without difficulties.  His energy levels have returned to baseline and he denies any pain.  His main complaint is in regards to feeling some difficulties with evacuating his stools.

## 2023-04-14 NOTE — ASSESSMENT
"Per     "Ins will no longer cover Lantus. Can you change the Rx to Basaglar for Jeanine Ramesh 1955. And have filled at [facility] on [address] at [place] (Where my Presc go to). She ran out last night and did not know had to re-order. Thanks!"  " [FreeTextEntry1] : subconjunctival hemorrhage - reassured pt on benign nature of condition. advised warm compresses to expedite resolution.

## 2023-04-24 ENCOUNTER — RX RENEWAL (OUTPATIENT)
Age: 63
End: 2023-04-24

## 2023-05-19 ENCOUNTER — RX RENEWAL (OUTPATIENT)
Age: 63
End: 2023-05-19

## 2023-08-21 ENCOUNTER — RX RENEWAL (OUTPATIENT)
Age: 63
End: 2023-08-21

## 2023-10-17 ENCOUNTER — APPOINTMENT (OUTPATIENT)
Dept: FAMILY MEDICINE | Facility: CLINIC | Age: 63
End: 2023-10-17
Payer: MEDICARE

## 2023-10-17 DIAGNOSIS — U07.1 COVID-19: ICD-10-CM

## 2023-10-17 PROCEDURE — 99213 OFFICE O/P EST LOW 20 MIN: CPT | Mod: 95

## 2024-01-01 NOTE — ASU PREOP CHECKLIST - CHLOROHEXIDINE WASH 1
Patients VSS. Feeding and output within normal limits. Continue with plan of care. Report given to Devika CARDENAS.    11-Mar-2023 07:00

## 2024-01-24 ENCOUNTER — APPOINTMENT (OUTPATIENT)
Dept: FAMILY MEDICINE | Facility: CLINIC | Age: 64
End: 2024-01-24
Payer: MEDICARE

## 2024-01-24 VITALS
BODY MASS INDEX: 29.39 KG/M2 | DIASTOLIC BLOOD PRESSURE: 74 MMHG | WEIGHT: 217 LBS | SYSTOLIC BLOOD PRESSURE: 122 MMHG | HEIGHT: 72 IN | HEART RATE: 83 BPM | OXYGEN SATURATION: 96 % | TEMPERATURE: 97.6 F

## 2024-01-24 DIAGNOSIS — E78.5 HYPERLIPIDEMIA, UNSPECIFIED: ICD-10-CM

## 2024-01-24 DIAGNOSIS — I10 ESSENTIAL (PRIMARY) HYPERTENSION: ICD-10-CM

## 2024-01-24 DIAGNOSIS — Z13.1 ENCOUNTER FOR SCREENING FOR DIABETES MELLITUS: ICD-10-CM

## 2024-01-24 PROCEDURE — 99214 OFFICE O/P EST MOD 30 MIN: CPT

## 2024-01-24 RX ORDER — SODIUM SULFATE, POTASSIUM SULFATE AND MAGNESIUM SULFATE 1.6; 3.13; 17.5 G/177ML; G/177ML; G/177ML
17.5-3.13-1.6 SOLUTION ORAL
Qty: 1 | Refills: 0 | Status: DISCONTINUED | COMMUNITY
Start: 2022-09-13 | End: 2024-01-24

## 2024-01-24 RX ORDER — MOLNUPIRAVIR 200 MG/1
200 CAPSULE ORAL
Qty: 1 | Refills: 0 | Status: DISCONTINUED | COMMUNITY
Start: 2023-10-19 | End: 2024-01-24

## 2024-01-24 RX ORDER — NIRMATRELVIR AND RITONAVIR 300-100 MG
20 X 150 MG & KIT ORAL
Qty: 1 | Refills: 0 | Status: DISCONTINUED | COMMUNITY
Start: 2023-10-17 | End: 2024-01-24

## 2024-01-24 RX ORDER — ALBUTEROL SULFATE 90 UG/1
108 (90 BASE) INHALANT RESPIRATORY (INHALATION)
Qty: 1 | Refills: 0 | Status: DISCONTINUED | COMMUNITY
Start: 2023-10-17 | End: 2024-01-24

## 2024-01-24 RX ORDER — LORAZEPAM 0.5 MG/1
0.5 TABLET ORAL
Qty: 60 | Refills: 0 | Status: DISCONTINUED | COMMUNITY
Start: 2021-06-14 | End: 2024-01-24

## 2024-01-24 RX ORDER — FENOFIBRATE 134 MG/1
134 CAPSULE ORAL
Qty: 90 | Refills: 1 | Status: ACTIVE | COMMUNITY
Start: 2019-09-09 | End: 1900-01-01

## 2024-01-24 RX ORDER — BENZONATATE 200 MG/1
200 CAPSULE ORAL
Qty: 30 | Refills: 0 | Status: DISCONTINUED | COMMUNITY
Start: 2023-10-17 | End: 2024-01-24

## 2024-01-24 NOTE — HEALTH RISK ASSESSMENT
[Yes] : Yes [No] : In the past 12 months have you used drugs other than those required for medical reasons? No [No falls in past year] : Patient reported no falls in the past year [0] : 2) Feeling down, depressed, or hopeless: Not at all (0) [PHQ-2 Negative - No further assessment needed] : PHQ-2 Negative - No further assessment needed [Never] : Never

## 2024-01-24 NOTE — HISTORY OF PRESENT ILLNESS
[de-identified] : PT presenting for medication f/u   HLD: fenofibrate 134 mg  HTN: stable, takes olmesartan-hctz daily BPH: tamsulosin

## 2024-01-31 LAB
ALBUMIN SERPL ELPH-MCNC: 4.4 G/DL
ALP BLD-CCNC: 68 U/L
ALT SERPL-CCNC: 30 U/L
ANION GAP SERPL CALC-SCNC: 12 MMOL/L
AST SERPL-CCNC: 34 U/L
BILIRUB SERPL-MCNC: 0.5 MG/DL
BUN SERPL-MCNC: 34 MG/DL
CALCIUM SERPL-MCNC: 9.8 MG/DL
CHLORIDE SERPL-SCNC: 100 MMOL/L
CHOLEST SERPL-MCNC: 228 MG/DL
CO2 SERPL-SCNC: 24 MMOL/L
CREAT SERPL-MCNC: 1.1 MG/DL
EGFR: 74 ML/MIN/1.73M2
ESTIMATED AVERAGE GLUCOSE: 120 MG/DL
GLUCOSE SERPL-MCNC: 89 MG/DL
HBA1C MFR BLD HPLC: 5.8 %
HDLC SERPL-MCNC: 44 MG/DL
LDLC SERPL CALC-MCNC: 147 MG/DL
NONHDLC SERPL-MCNC: 184 MG/DL
POTASSIUM SERPL-SCNC: 4.5 MMOL/L
PROT SERPL-MCNC: 6.9 G/DL
SODIUM SERPL-SCNC: 137 MMOL/L
TRIGL SERPL-MCNC: 200 MG/DL

## 2024-05-29 ENCOUNTER — NON-APPOINTMENT (OUTPATIENT)
Age: 64
End: 2024-05-29

## 2024-06-04 ENCOUNTER — APPOINTMENT (OUTPATIENT)
Dept: FAMILY MEDICINE | Facility: CLINIC | Age: 64
End: 2024-06-04
Payer: MEDICARE

## 2024-06-04 VITALS
HEART RATE: 87 BPM | TEMPERATURE: 97.8 F | OXYGEN SATURATION: 98 % | DIASTOLIC BLOOD PRESSURE: 74 MMHG | HEIGHT: 72 IN | WEIGHT: 218 LBS | BODY MASS INDEX: 29.53 KG/M2 | SYSTOLIC BLOOD PRESSURE: 124 MMHG

## 2024-06-04 DIAGNOSIS — H53.2 DIPLOPIA: ICD-10-CM

## 2024-06-04 PROCEDURE — 99214 OFFICE O/P EST MOD 30 MIN: CPT

## 2024-06-04 PROCEDURE — G2211 COMPLEX E/M VISIT ADD ON: CPT

## 2024-06-04 NOTE — ASSESSMENT
[FreeTextEntry1] : check labs check head CT r/o mass due to worsening double vision should f/u with optho for eval

## 2024-06-04 NOTE — HISTORY OF PRESENT ILLNESS
[FreeTextEntry8] : PT presenting for double vision x 4 months, has been worsening in the past 2 months has double vision mainly during the day no hx of DM and has not changed diet recently no nausea or vomiting  saw optometrist >4 months ago

## 2024-06-06 ENCOUNTER — OUTPATIENT (OUTPATIENT)
Dept: OUTPATIENT SERVICES | Facility: HOSPITAL | Age: 64
LOS: 1 days | End: 2024-06-06
Payer: MEDICARE

## 2024-06-06 ENCOUNTER — APPOINTMENT (OUTPATIENT)
Dept: CT IMAGING | Facility: CLINIC | Age: 64
End: 2024-06-06
Payer: MEDICARE

## 2024-06-06 DIAGNOSIS — Z98.890 OTHER SPECIFIED POSTPROCEDURAL STATES: Chronic | ICD-10-CM

## 2024-06-06 DIAGNOSIS — Z90.89 ACQUIRED ABSENCE OF OTHER ORGANS: Chronic | ICD-10-CM

## 2024-06-06 DIAGNOSIS — H53.2 DIPLOPIA: ICD-10-CM

## 2024-06-06 PROCEDURE — 70450 CT HEAD/BRAIN W/O DYE: CPT | Mod: 26,MH

## 2024-06-06 PROCEDURE — 70450 CT HEAD/BRAIN W/O DYE: CPT

## 2024-06-07 LAB
ALBUMIN SERPL ELPH-MCNC: 4.4 G/DL
ALP BLD-CCNC: 47 U/L
ALT SERPL-CCNC: 23 U/L
ANION GAP SERPL CALC-SCNC: 14 MMOL/L
AST SERPL-CCNC: 25 U/L
BILIRUB SERPL-MCNC: 0.4 MG/DL
BUN SERPL-MCNC: 27 MG/DL
CALCIUM SERPL-MCNC: 10 MG/DL
CHLORIDE SERPL-SCNC: 101 MMOL/L
CHOLEST SERPL-MCNC: 194 MG/DL
CO2 SERPL-SCNC: 24 MMOL/L
CREAT SERPL-MCNC: 1.17 MG/DL
EGFR: 69 ML/MIN/1.73M2
ESTIMATED AVERAGE GLUCOSE: 126 MG/DL
GLUCOSE SERPL-MCNC: 86 MG/DL
HBA1C MFR BLD HPLC: 6 %
HDLC SERPL-MCNC: 46 MG/DL
LDLC SERPL CALC-MCNC: 124 MG/DL
NONHDLC SERPL-MCNC: 148 MG/DL
POTASSIUM SERPL-SCNC: 4.1 MMOL/L
PROT SERPL-MCNC: 7.2 G/DL
SODIUM SERPL-SCNC: 139 MMOL/L
TRIGL SERPL-MCNC: 132 MG/DL

## 2024-06-10 ENCOUNTER — OFFICE (OUTPATIENT)
Dept: URBAN - METROPOLITAN AREA CLINIC 12 | Facility: CLINIC | Age: 64
Setting detail: OPHTHALMOLOGY
End: 2024-06-10
Payer: MEDICARE

## 2024-06-10 DIAGNOSIS — H53.2: ICD-10-CM

## 2024-06-10 DIAGNOSIS — H01.001: ICD-10-CM

## 2024-06-10 DIAGNOSIS — H25.13: ICD-10-CM

## 2024-06-10 DIAGNOSIS — H01.004: ICD-10-CM

## 2024-06-10 PROCEDURE — 92004 COMPRE OPH EXAM NEW PT 1/>: CPT | Performed by: OPHTHALMOLOGY

## 2024-06-10 ASSESSMENT — CONFRONTATIONAL VISUAL FIELD TEST (CVF)
OD_FINDINGS: FULL
OS_FINDINGS: FULL

## 2024-06-10 ASSESSMENT — LID EXAM ASSESSMENTS
OD_BLEPHARITIS: 1+
OS_BLEPHARITIS: 1+

## 2024-06-19 ENCOUNTER — OFFICE (OUTPATIENT)
Dept: URBAN - METROPOLITAN AREA CLINIC 100 | Facility: CLINIC | Age: 64
Setting detail: OPHTHALMOLOGY
End: 2024-06-19
Payer: MEDICARE

## 2024-06-19 DIAGNOSIS — H50.05: ICD-10-CM

## 2024-06-19 DIAGNOSIS — H52.03: ICD-10-CM

## 2024-06-19 PROBLEM — H25.13 CATARACT; BOTH EYES: Status: ACTIVE | Noted: 2024-06-10

## 2024-06-19 PROBLEM — H52.7 REFRACTIVE ERROR ; BOTH EYES: Status: ACTIVE | Noted: 2024-06-19

## 2024-06-19 PROBLEM — H01.004 BLEPHARITIS; RIGHT UPPER LID, LEFT UPPER LID: Status: ACTIVE | Noted: 2024-06-10

## 2024-06-19 PROBLEM — H01.001 BLEPHARITIS; RIGHT UPPER LID, LEFT UPPER LID: Status: ACTIVE | Noted: 2024-06-10

## 2024-06-19 PROBLEM — H53.2 DIPLOPIA ; BOTH EYES: Status: ACTIVE | Noted: 2024-06-10

## 2024-06-19 PROCEDURE — 92060 SENSORIMOTOR EXAMINATION: CPT | Performed by: OPHTHALMOLOGY

## 2024-06-19 PROCEDURE — 92015 DETERMINE REFRACTIVE STATE: CPT | Performed by: OPHTHALMOLOGY

## 2024-06-19 PROCEDURE — 99213 OFFICE O/P EST LOW 20 MIN: CPT | Performed by: OPHTHALMOLOGY

## 2024-06-19 ASSESSMENT — LID EXAM ASSESSMENTS
OD_BLEPHARITIS: 1+
OS_BLEPHARITIS: 1+

## 2024-06-19 ASSESSMENT — CONFRONTATIONAL VISUAL FIELD TEST (CVF)
OD_FINDINGS: FULL
OS_FINDINGS: FULL

## 2024-10-02 ENCOUNTER — OFFICE (OUTPATIENT)
Dept: URBAN - METROPOLITAN AREA CLINIC 12 | Facility: CLINIC | Age: 64
Setting detail: OPHTHALMOLOGY
End: 2024-10-02
Payer: MEDICARE

## 2024-10-02 ENCOUNTER — NON-APPOINTMENT (OUTPATIENT)
Age: 64
End: 2024-10-02

## 2024-10-02 DIAGNOSIS — H53.2: ICD-10-CM

## 2024-10-02 DIAGNOSIS — H01.004: ICD-10-CM

## 2024-10-02 DIAGNOSIS — H01.001: ICD-10-CM

## 2024-10-02 DIAGNOSIS — H90.3: ICD-10-CM

## 2024-10-02 DIAGNOSIS — H25.13: ICD-10-CM

## 2024-10-02 DIAGNOSIS — H50.05: ICD-10-CM

## 2024-10-02 PROCEDURE — 92014 COMPRE OPH EXAM EST PT 1/>: CPT | Performed by: OPHTHALMOLOGY

## 2024-10-02 PROCEDURE — 92557 COMPREHENSIVE HEARING TEST: CPT | Mod: AB

## 2024-10-02 PROCEDURE — 92567 TYMPANOMETRY: CPT | Mod: AB

## 2024-10-02 ASSESSMENT — REFRACTION_CURRENTRX
OS_SPHERE: +1.25
OS_AXIS: 076
OD_CYLINDER: -0.75
OD_OVR_VA: 20/
OD_SPHERE: +3.50
OS_CYLINDER: -0.75
OD_AXIS: 117
OD_SPHERE: +0.25
OS_CYLINDER: -1.00
OD_ADD: +2.20
OS_ADD: +2.00
OS_OVR_VA: 20/
OS_SPHERE: +2.50
OD_VPRISM_DIRECTION: SV
OS_AXIS: 074
OS_OVR_VA: 20/
OS_VPRISM_DIRECTION: SV
OD_AXIS: 084
OD_OVR_VA: 20/
OD_CYLINDER: -0.25

## 2024-10-02 ASSESSMENT — REFRACTION_MANIFEST
OD_CYLINDER: -1.25
OS_SPHERE: +1.25
OS_HPRISM: 5
OS_AXIS: 075
OD_ADD: +2.25
OD_AXIS: 095
OS_ADD: +2.25
OD_HPRISM: 5
OS_AXIS: 070
OD_VA1: 20/20
OD_VA1: 20/20
OD_SPHERE: +2.00
OS_VPRISM_DIRECTION: BO
OS_VA1: 20/25
OD_CYLINDER: -1.25
OS_SPHERE: +1.50
OD_SPHERE: +1.75
OS_CYLINDER: -1.25
OD_AXIS: 095
OS_VA1: 20/20-
OD_VPRISM_DIRECTION: BO
OS_CYLINDER: -1.75

## 2024-10-02 ASSESSMENT — KERATOMETRY
OD_K2POWER_DIOPTERS: 41.75
OS_AXISANGLE_DEGREES: 172
OD_AXISANGLE_DEGREES: 004
OS_K2POWER_DIOPTERS: 42.00
OS_K1POWER_DIOPTERS: 40.75
OD_K1POWER_DIOPTERS: 40.75

## 2024-10-02 ASSESSMENT — VISUAL ACUITY
OD_BCVA: 20/25-3
OS_BCVA: 20/25

## 2024-10-02 ASSESSMENT — REFRACTION_AUTOREFRACTION
OS_CYLINDER: -1.75
OS_SPHERE: +2.00
OD_CYLINDER: -1.25
OD_AXIS: 096
OD_SPHERE: +2.25
OS_AXIS: 068

## 2024-10-02 ASSESSMENT — TONOMETRY
OS_IOP_MMHG: 13
OD_IOP_MMHG: 12

## 2024-10-02 ASSESSMENT — LID EXAM ASSESSMENTS
OS_BLEPHARITIS: 1+
OD_BLEPHARITIS: 1+

## 2024-10-02 ASSESSMENT — CONFRONTATIONAL VISUAL FIELD TEST (CVF)
OD_FINDINGS: FULL
OS_FINDINGS: FULL

## 2024-12-07 ENCOUNTER — NON-APPOINTMENT (OUTPATIENT)
Age: 64
End: 2024-12-07

## 2024-12-17 ENCOUNTER — OFFICE (OUTPATIENT)
Dept: URBAN - METROPOLITAN AREA CLINIC 100 | Facility: CLINIC | Age: 64
Setting detail: OPHTHALMOLOGY
End: 2024-12-17
Payer: MEDICARE

## 2024-12-17 ENCOUNTER — RX ONLY (RX ONLY)
Age: 64
End: 2024-12-17

## 2024-12-17 DIAGNOSIS — H50.05: ICD-10-CM

## 2024-12-17 DIAGNOSIS — H01.001: ICD-10-CM

## 2024-12-17 DIAGNOSIS — H52.7: ICD-10-CM

## 2024-12-17 DIAGNOSIS — H11.153: ICD-10-CM

## 2024-12-17 DIAGNOSIS — H01.004: ICD-10-CM

## 2024-12-17 DIAGNOSIS — H53.2: ICD-10-CM

## 2024-12-17 DIAGNOSIS — H52.4: ICD-10-CM

## 2024-12-17 DIAGNOSIS — H25.13: ICD-10-CM

## 2024-12-17 PROCEDURE — 92015 DETERMINE REFRACTIVE STATE: CPT | Performed by: OPHTHALMOLOGY

## 2024-12-17 PROCEDURE — 92060 SENSORIMOTOR EXAMINATION: CPT | Performed by: OPHTHALMOLOGY

## 2024-12-17 PROCEDURE — 99213 OFFICE O/P EST LOW 20 MIN: CPT | Performed by: OPHTHALMOLOGY

## 2024-12-17 ASSESSMENT — CONFRONTATIONAL VISUAL FIELD TEST (CVF)
OD_FINDINGS: FULL
OS_FINDINGS: FULL

## 2024-12-17 ASSESSMENT — REFRACTION_CURRENTRX
OS_OVR_VA: 20/
OS_CYLINDER: -0.75
OS_VPRISM_DIRECTION: SV
OD_CYLINDER: -0.75
OS_AXIS: 076
OD_SPHERE: +0.25
OD_ADD: +2.25
OD_AXIS: 110
OD_OVR_VA: 20/
OS_CYLINDER: -1.00
OS_SPHERE: +2.50
OS_AXIS: 069
OD_AXIS: 117
OD_ADD: +2.20
OD_SPHERE: +3.50
OD_SPHERE: +3.50
OD_OVR_VA: 20/
OD_CYLINDER: -0.25
OD_CYLINDER: -2.25
OD_VPRISM_DIRECTION: SV
OS_CYLINDER: -1.00
OD_VPRISM_DIRECTION: TRF
OD_AXIS: 084
OS_SPHERE: +1.25
OS_ADD: +2.00
OS_OVR_VA: 20/
OS_ADD: +2.25
OS_SPHERE: +1.25
OD_OVR_VA: 20/
OS_AXIS: 074
OS_VPRISM_DIRECTION: TRF
OS_OVR_VA: 20/

## 2024-12-17 ASSESSMENT — KERATOMETRY
METHOD_AUTO_MANUAL: AUTO
OS_AXISANGLE_DEGREES: 174
OD_K1POWER_DIOPTERS: 40.25
OS_K2POWER_DIOPTERS: 42.00
OS_K1POWER_DIOPTERS: 39.50
OD_AXISANGLE_DEGREES: 014
OD_K2POWER_DIOPTERS: 42.25

## 2024-12-17 ASSESSMENT — REFRACTION_MANIFEST
OS_AXIS: 075
OS_VPRISM_DIRECTION: BO
OD_VA1: 20/20
OD_AXIS: 100
OD_SPHERE: +1.75
OS_VA1: 20/25
OS_VA1: 20/20-
OS_CYLINDER: -1.25
OD_VA1: 20/20
OD_AXIS: 095
OS_AXIS: 070
OD_CYLINDER: -1.25
OD_SPHERE: +2.00
OD_HPRISM: 5
OD_CYLINDER: -1.25
OS_CYLINDER: -1.75
OD_ADD: +2.75
OS_SPHERE: +1.25
OS_ADD: +2.75
OS_SPHERE: +1.50
OS_HPRISM: 5
OD_VPRISM_DIRECTION: BO

## 2024-12-17 ASSESSMENT — REFRACTION_AUTOREFRACTION
OS_CYLINDER: -2.75
OD_SPHERE: +3.00
OS_SPHERE: +2.75
OD_CYLINDER: -2.50
OD_AXIS: 107
OS_AXIS: 072

## 2024-12-17 ASSESSMENT — VISUAL ACUITY
OS_BCVA: 20/30-2
OD_BCVA: 20/40+

## 2024-12-17 ASSESSMENT — LID EXAM ASSESSMENTS
OD_BLEPHARITIS: 1+
OS_BLEPHARITIS: 1+

## 2024-12-17 ASSESSMENT — TONOMETRY
OD_IOP_MMHG: 11
OS_IOP_MMHG: 13

## 2025-01-23 ENCOUNTER — APPOINTMENT (OUTPATIENT)
Dept: FAMILY MEDICINE | Facility: CLINIC | Age: 65
End: 2025-01-23
Payer: MEDICARE

## 2025-01-23 VITALS
TEMPERATURE: 98.1 F | OXYGEN SATURATION: 95 % | WEIGHT: 219.38 LBS | HEART RATE: 75 BPM | DIASTOLIC BLOOD PRESSURE: 62 MMHG | SYSTOLIC BLOOD PRESSURE: 112 MMHG | HEIGHT: 72 IN | BODY MASS INDEX: 29.71 KG/M2

## 2025-01-23 DIAGNOSIS — N40.1 BENIGN PROSTATIC HYPERPLASIA WITH LOWER URINARY TRACT SYMPMS: ICD-10-CM

## 2025-01-23 DIAGNOSIS — Z13.228 ENCOUNTER FOR SCREENING FOR OTHER SUSPECTED ENDOCRINE DISORDER: ICD-10-CM

## 2025-01-23 DIAGNOSIS — Z13.0 ENCOUNTER FOR SCREENING FOR OTHER SUSPECTED ENDOCRINE DISORDER: ICD-10-CM

## 2025-01-23 DIAGNOSIS — Z00.00 ENCOUNTER FOR GENERAL ADULT MEDICAL EXAMINATION W/OUT ABNORMAL FINDINGS: ICD-10-CM

## 2025-01-23 DIAGNOSIS — D48.9 NEOPLASM OF UNCERTAIN BEHAVIOR, UNSPECIFIED: ICD-10-CM

## 2025-01-23 DIAGNOSIS — Z13.29 ENCOUNTER FOR SCREENING FOR OTHER SUSPECTED ENDOCRINE DISORDER: ICD-10-CM

## 2025-01-23 DIAGNOSIS — N13.8 BENIGN PROSTATIC HYPERPLASIA WITH LOWER URINARY TRACT SYMPMS: ICD-10-CM

## 2025-01-23 DIAGNOSIS — I10 ESSENTIAL (PRIMARY) HYPERTENSION: ICD-10-CM

## 2025-01-23 DIAGNOSIS — E78.5 HYPERLIPIDEMIA, UNSPECIFIED: ICD-10-CM

## 2025-01-23 DIAGNOSIS — U07.1 COVID-19: ICD-10-CM

## 2025-01-23 DIAGNOSIS — Z12.5 ENCOUNTER FOR SCREENING FOR MALIGNANT NEOPLASM OF PROSTATE: ICD-10-CM

## 2025-01-23 PROCEDURE — 99214 OFFICE O/P EST MOD 30 MIN: CPT | Mod: 25

## 2025-01-23 PROCEDURE — G0438: CPT

## 2025-01-23 RX ORDER — OLMESARTAN MEDOXOMIL 20 MG/1
20 TABLET, FILM COATED ORAL
Qty: 90 | Refills: 1 | Status: ACTIVE | COMMUNITY
Start: 2025-01-23 | End: 1900-01-01

## 2025-01-24 ENCOUNTER — NON-APPOINTMENT (OUTPATIENT)
Age: 65
End: 2025-01-24

## 2025-01-24 DIAGNOSIS — R97.20 ELEVATED PROSTATE, SPECIFIC ANTIGEN [PSA]: ICD-10-CM

## 2025-01-24 LAB
ALBUMIN SERPL ELPH-MCNC: 4.3 G/DL
ALP BLD-CCNC: 49 U/L
ALT SERPL-CCNC: 23 U/L
ANION GAP SERPL CALC-SCNC: 12 MMOL/L
AST SERPL-CCNC: 23 U/L
BASOPHILS # BLD AUTO: 0.04 K/UL
BASOPHILS NFR BLD AUTO: 0.7 %
BILIRUB SERPL-MCNC: 0.4 MG/DL
BUN SERPL-MCNC: 31 MG/DL
CALCIUM SERPL-MCNC: 9.6 MG/DL
CHLORIDE SERPL-SCNC: 102 MMOL/L
CHOLEST SERPL-MCNC: 202 MG/DL
CO2 SERPL-SCNC: 26 MMOL/L
CREAT SERPL-MCNC: 1.17 MG/DL
EGFR: 68 ML/MIN/1.73M2
EOSINOPHIL # BLD AUTO: 0.35 K/UL
EOSINOPHIL NFR BLD AUTO: 5.8 %
ESTIMATED AVERAGE GLUCOSE: 131 MG/DL
GLUCOSE SERPL-MCNC: 96 MG/DL
HBA1C MFR BLD HPLC: 6.2 %
HCT VFR BLD CALC: 47.2 %
HDLC SERPL-MCNC: 45 MG/DL
HGB BLD-MCNC: 15.2 G/DL
IMM GRANULOCYTES NFR BLD AUTO: 0.3 %
LDLC SERPL CALC-MCNC: 136 MG/DL
LYMPHOCYTES # BLD AUTO: 1.81 K/UL
LYMPHOCYTES NFR BLD AUTO: 29.9 %
MAN DIFF?: NORMAL
MCHC RBC-ENTMCNC: 27.8 PG
MCHC RBC-ENTMCNC: 32.2 G/DL
MCV RBC AUTO: 86.4 FL
MONOCYTES # BLD AUTO: 0.73 K/UL
MONOCYTES NFR BLD AUTO: 12 %
NEUTROPHILS # BLD AUTO: 3.11 K/UL
NEUTROPHILS NFR BLD AUTO: 51.3 %
NONHDLC SERPL-MCNC: 157 MG/DL
PLATELET # BLD AUTO: 321 K/UL
POTASSIUM SERPL-SCNC: 4.3 MMOL/L
PROT SERPL-MCNC: 6.9 G/DL
PSA SERPL-MCNC: 4.57 NG/ML
RBC # BLD: 5.46 M/UL
RBC # FLD: 13.1 %
SODIUM SERPL-SCNC: 141 MMOL/L
TRIGL SERPL-MCNC: 114 MG/DL
TSH SERPL-ACNC: 2.7 UIU/ML
WBC # FLD AUTO: 6.06 K/UL

## 2025-03-10 ENCOUNTER — APPOINTMENT (OUTPATIENT)
Dept: UROLOGY | Facility: CLINIC | Age: 65
End: 2025-03-10
Payer: MEDICARE

## 2025-03-10 VITALS
SYSTOLIC BLOOD PRESSURE: 112 MMHG | DIASTOLIC BLOOD PRESSURE: 70 MMHG | HEIGHT: 72 IN | HEART RATE: 73 BPM | WEIGHT: 219 LBS | BODY MASS INDEX: 29.66 KG/M2

## 2025-03-10 PROCEDURE — 99203 OFFICE O/P NEW LOW 30 MIN: CPT

## 2025-03-12 DIAGNOSIS — R97.20 ELEVATED PROSTATE, SPECIFIC ANTIGEN [PSA]: ICD-10-CM

## 2025-03-12 LAB
PSA FREE FLD-MCNC: 15 %
PSA FREE SERPL-MCNC: 0.75 NG/ML
PSA SERPL-MCNC: 5.1 NG/ML

## 2025-03-13 ENCOUNTER — APPOINTMENT (OUTPATIENT)
Dept: ORTHOPEDIC SURGERY | Facility: CLINIC | Age: 65
End: 2025-03-13
Payer: MEDICARE

## 2025-03-13 ENCOUNTER — APPOINTMENT (OUTPATIENT)
Dept: MRI IMAGING | Facility: CLINIC | Age: 65
End: 2025-03-13
Payer: MEDICARE

## 2025-03-13 VITALS — HEIGHT: 72 IN | BODY MASS INDEX: 29.12 KG/M2 | WEIGHT: 215 LBS

## 2025-03-13 PROBLEM — S46.011S: Status: ACTIVE | Noted: 2025-03-13

## 2025-03-13 PROCEDURE — 73221 MRI JOINT UPR EXTREM W/O DYE: CPT | Mod: RT

## 2025-03-13 PROCEDURE — 20610 DRAIN/INJ JOINT/BURSA W/O US: CPT | Mod: RT

## 2025-03-13 PROCEDURE — 73030 X-RAY EXAM OF SHOULDER: CPT | Mod: RT

## 2025-03-13 PROCEDURE — 73564 X-RAY EXAM KNEE 4 OR MORE: CPT | Mod: RT

## 2025-03-13 PROCEDURE — 99204 OFFICE O/P NEW MOD 45 MIN: CPT | Mod: 25

## 2025-03-13 PROCEDURE — 73010 X-RAY EXAM OF SHOULDER BLADE: CPT | Mod: RT

## 2025-03-13 RX ORDER — MELOXICAM 15 MG/1
15 TABLET ORAL
Qty: 30 | Refills: 2 | Status: ACTIVE | COMMUNITY
Start: 2025-03-13 | End: 1900-01-01

## 2025-03-20 ENCOUNTER — APPOINTMENT (OUTPATIENT)
Dept: ORTHOPEDIC SURGERY | Facility: CLINIC | Age: 65
End: 2025-03-20
Payer: MEDICARE

## 2025-03-20 DIAGNOSIS — S46.011S STRAIN OF MUSCLE(S) AND TENDON(S) OF THE ROTATOR CUFF OF RIGHT SHOULDER, SEQUELA: ICD-10-CM

## 2025-03-20 PROCEDURE — 99214 OFFICE O/P EST MOD 30 MIN: CPT

## 2025-03-21 ENCOUNTER — RESULT REVIEW (OUTPATIENT)
Age: 65
End: 2025-03-21

## 2025-03-21 ENCOUNTER — OUTPATIENT (OUTPATIENT)
Dept: OUTPATIENT SERVICES | Facility: HOSPITAL | Age: 65
LOS: 1 days | End: 2025-03-21
Payer: MEDICARE

## 2025-03-21 ENCOUNTER — APPOINTMENT (OUTPATIENT)
Dept: MRI IMAGING | Facility: CLINIC | Age: 65
End: 2025-03-21

## 2025-03-21 DIAGNOSIS — Z90.89 ACQUIRED ABSENCE OF OTHER ORGANS: Chronic | ICD-10-CM

## 2025-03-21 DIAGNOSIS — Z98.890 OTHER SPECIFIED POSTPROCEDURAL STATES: Chronic | ICD-10-CM

## 2025-03-21 DIAGNOSIS — R97.20 ELEVATED PROSTATE SPECIFIC ANTIGEN [PSA]: ICD-10-CM

## 2025-03-21 PROCEDURE — 72197 MRI PELVIS W/O & W/DYE: CPT

## 2025-03-21 PROCEDURE — A9585: CPT

## 2025-03-21 PROCEDURE — 72197 MRI PELVIS W/O & W/DYE: CPT | Mod: 26

## 2025-03-21 PROCEDURE — 76498 UNLISTED MR PROCEDURE: CPT

## 2025-03-21 PROCEDURE — 76498P: CUSTOM | Mod: 26

## 2025-04-01 ENCOUNTER — TRANSCRIPTION ENCOUNTER (OUTPATIENT)
Age: 65
End: 2025-04-01

## 2025-04-03 ENCOUNTER — APPOINTMENT (OUTPATIENT)
Dept: UROLOGY | Facility: CLINIC | Age: 65
End: 2025-04-03
Payer: MEDICARE

## 2025-04-03 ENCOUNTER — NON-APPOINTMENT (OUTPATIENT)
Age: 65
End: 2025-04-03

## 2025-04-03 ENCOUNTER — OFFICE (OUTPATIENT)
Dept: URBAN - METROPOLITAN AREA CLINIC 12 | Facility: CLINIC | Age: 65
Setting detail: OPHTHALMOLOGY
End: 2025-04-03

## 2025-04-03 VITALS
SYSTOLIC BLOOD PRESSURE: 125 MMHG | WEIGHT: 215 LBS | HEART RATE: 82 BPM | BODY MASS INDEX: 29.12 KG/M2 | DIASTOLIC BLOOD PRESSURE: 79 MMHG | RESPIRATION RATE: 16 BRPM | HEIGHT: 72 IN | OXYGEN SATURATION: 97 %

## 2025-04-03 DIAGNOSIS — N40.1 BENIGN PROSTATIC HYPERPLASIA WITH LOWER URINARY TRACT SYMPMS: ICD-10-CM
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
## 2025-04-03 DIAGNOSIS — N13.8 BENIGN PROSTATIC HYPERPLASIA WITH LOWER URINARY TRACT SYMPMS: ICD-10-CM

## 2025-04-03 DIAGNOSIS — R97.20 ELEVATED PROSTATE, SPECIFIC ANTIGEN [PSA]: ICD-10-CM

## 2025-04-03 PROCEDURE — NO SHOW FE NO SHOW FEE: Performed by: OPHTHALMOLOGY

## 2025-04-03 PROCEDURE — 99214 OFFICE O/P EST MOD 30 MIN: CPT

## 2025-04-07 DIAGNOSIS — N39.0 URINARY TRACT INFECTION, SITE NOT SPECIFIED: ICD-10-CM

## 2025-04-07 LAB
APPEARANCE: CLEAR
BACTERIA UR CULT: ABNORMAL
BACTERIA: NEGATIVE /HPF
BILIRUBIN URINE: NEGATIVE
BLOOD URINE: NEGATIVE
CAST: 0 /LPF
COLOR: YELLOW
EPITHELIAL CELLS: 1 /HPF
GLUCOSE QUALITATIVE U: NEGATIVE MG/DL
KETONES URINE: NEGATIVE MG/DL
LEUKOCYTE ESTERASE URINE: ABNORMAL
MICROSCOPIC-UA: NORMAL
NITRITE URINE: NEGATIVE
PH URINE: 6
PROTEIN URINE: NEGATIVE MG/DL
RED BLOOD CELLS URINE: 0 /HPF
SPECIFIC GRAVITY URINE: 1.02
UROBILINOGEN URINE: 0.2 MG/DL
WHITE BLOOD CELLS URINE: 18 /HPF

## 2025-04-07 RX ORDER — CIPROFLOXACIN HYDROCHLORIDE 500 MG/1
500 TABLET, FILM COATED ORAL TWICE DAILY
Qty: 14 | Refills: 0 | Status: ACTIVE | COMMUNITY
Start: 2025-04-07 | End: 1900-01-01

## 2025-04-17 ENCOUNTER — APPOINTMENT (OUTPATIENT)
Dept: ORTHOPEDIC SURGERY | Facility: CLINIC | Age: 65
End: 2025-04-17
Payer: MEDICARE

## 2025-04-17 DIAGNOSIS — S46.011S STRAIN OF MUSCLE(S) AND TENDON(S) OF THE ROTATOR CUFF OF RIGHT SHOULDER, SEQUELA: ICD-10-CM

## 2025-04-17 PROCEDURE — 99214 OFFICE O/P EST MOD 30 MIN: CPT

## 2025-06-28 ENCOUNTER — LABORATORY RESULT (OUTPATIENT)
Age: 65
End: 2025-06-28

## 2025-07-03 ENCOUNTER — APPOINTMENT (OUTPATIENT)
Dept: UROLOGY | Facility: CLINIC | Age: 65
End: 2025-07-03
Payer: MEDICARE

## 2025-07-03 VITALS
BODY MASS INDEX: 29.16 KG/M2 | HEART RATE: 73 BPM | DIASTOLIC BLOOD PRESSURE: 67 MMHG | SYSTOLIC BLOOD PRESSURE: 108 MMHG | WEIGHT: 215 LBS

## 2025-07-03 PROCEDURE — 99214 OFFICE O/P EST MOD 30 MIN: CPT

## 2025-07-07 PROBLEM — N39.0 ACUTE UTI: Status: ACTIVE | Noted: 2025-04-07

## 2025-07-07 LAB — BACTERIA UR CULT: ABNORMAL

## 2025-07-07 RX ORDER — CIPROFLOXACIN HYDROCHLORIDE 500 MG/1
500 TABLET, FILM COATED ORAL
Qty: 14 | Refills: 0 | Status: ACTIVE | COMMUNITY
Start: 2025-07-07 | End: 1900-01-01

## 2025-07-16 ENCOUNTER — RX RENEWAL (OUTPATIENT)
Age: 65
End: 2025-07-16

## 2025-07-21 ENCOUNTER — APPOINTMENT (OUTPATIENT)
Dept: FAMILY MEDICINE | Facility: CLINIC | Age: 65
End: 2025-07-21
Payer: MEDICARE

## 2025-07-21 VITALS
BODY MASS INDEX: 29.12 KG/M2 | SYSTOLIC BLOOD PRESSURE: 108 MMHG | DIASTOLIC BLOOD PRESSURE: 70 MMHG | HEIGHT: 72 IN | HEART RATE: 88 BPM | TEMPERATURE: 97.7 F | RESPIRATION RATE: 16 BRPM | WEIGHT: 215 LBS | OXYGEN SATURATION: 96 %

## 2025-07-21 DIAGNOSIS — I10 ESSENTIAL (PRIMARY) HYPERTENSION: ICD-10-CM

## 2025-07-21 DIAGNOSIS — E78.5 HYPERLIPIDEMIA, UNSPECIFIED: ICD-10-CM

## 2025-07-21 PROCEDURE — 99214 OFFICE O/P EST MOD 30 MIN: CPT
